# Patient Record
Sex: FEMALE | Race: WHITE | NOT HISPANIC OR LATINO | Employment: PART TIME | URBAN - METROPOLITAN AREA
[De-identification: names, ages, dates, MRNs, and addresses within clinical notes are randomized per-mention and may not be internally consistent; named-entity substitution may affect disease eponyms.]

---

## 2021-05-25 ENCOUNTER — RECORDS - HEALTHEAST (OUTPATIENT)
Dept: ADMINISTRATIVE | Facility: CLINIC | Age: 56
End: 2021-05-25

## 2021-05-26 ENCOUNTER — RECORDS - HEALTHEAST (OUTPATIENT)
Dept: ADMINISTRATIVE | Facility: OTHER | Age: 56
End: 2021-05-26

## 2021-05-28 ENCOUNTER — RECORDS - HEALTHEAST (OUTPATIENT)
Dept: ADMINISTRATIVE | Facility: CLINIC | Age: 56
End: 2021-05-28

## 2021-05-28 ENCOUNTER — RECORDS - HEALTHEAST (OUTPATIENT)
Dept: ADMINISTRATIVE | Facility: OTHER | Age: 56
End: 2021-05-28

## 2021-05-30 ENCOUNTER — AMBULATORY - HEALTHEAST (OUTPATIENT)
Dept: SURGERY | Facility: HOSPITAL | Age: 56
End: 2021-05-30

## 2021-05-30 DIAGNOSIS — Z11.59 ENCOUNTER FOR SCREENING FOR OTHER VIRAL DISEASES: ICD-10-CM

## 2021-06-27 DIAGNOSIS — Z11.59 ENCOUNTER FOR SCREENING FOR OTHER VIRAL DISEASES: ICD-10-CM

## 2021-07-13 ENCOUNTER — RECORDS - HEALTHEAST (OUTPATIENT)
Dept: ADMINISTRATIVE | Facility: CLINIC | Age: 56
End: 2021-07-13

## 2021-07-21 ENCOUNTER — ANCILLARY PROCEDURE (OUTPATIENT)
Dept: ULTRASOUND IMAGING | Facility: HOSPITAL | Age: 56
End: 2021-07-21
Attending: ANESTHESIOLOGY
Payer: COMMERCIAL

## 2021-07-21 ENCOUNTER — ANESTHESIA EVENT (OUTPATIENT)
Dept: SURGERY | Facility: HOSPITAL | Age: 56
End: 2021-07-21
Payer: COMMERCIAL

## 2021-07-21 ENCOUNTER — ANESTHESIA (OUTPATIENT)
Dept: SURGERY | Facility: HOSPITAL | Age: 56
End: 2021-07-21
Payer: COMMERCIAL

## 2021-07-21 ENCOUNTER — RECORDS - HEALTHEAST (OUTPATIENT)
Dept: ADMINISTRATIVE | Facility: CLINIC | Age: 56
End: 2021-07-21

## 2021-07-21 ENCOUNTER — HOSPITAL ENCOUNTER (OUTPATIENT)
Facility: HOSPITAL | Age: 56
Discharge: HOME | End: 2021-07-22
Attending: OBSTETRICS & GYNECOLOGY | Admitting: OBSTETRICS & GYNECOLOGY
Payer: COMMERCIAL

## 2021-07-21 DIAGNOSIS — N81.2 UTEROVAGINAL PROLAPSE, INCOMPLETE: Primary | ICD-10-CM

## 2021-07-21 PROBLEM — Z98.890 POSTOPERATIVE STATE: Status: ACTIVE | Noted: 2021-07-21

## 2021-07-21 LAB
ABO/RH(D): NORMAL
ANTIBODY SCREEN: NEGATIVE
HCG SERPL-ACNC: 7 MLU/ML (ref 0–4)
HCG SERPL-ACNC: 7 MLU/ML (ref 0–4)
SPECIMEN EXPIRATION DATE: NORMAL

## 2021-07-21 PROCEDURE — 250N000009 HC RX 250: Performed by: ANESTHESIOLOGY

## 2021-07-21 PROCEDURE — 88307 TISSUE EXAM BY PATHOLOGIST: CPT | Mod: TC | Performed by: OBSTETRICS & GYNECOLOGY

## 2021-07-21 PROCEDURE — 250N000011 HC RX IP 250 OP 636: Performed by: STUDENT IN AN ORGANIZED HEALTH CARE EDUCATION/TRAINING PROGRAM

## 2021-07-21 PROCEDURE — 250N000011 HC RX IP 250 OP 636: Performed by: OBSTETRICS & GYNECOLOGY

## 2021-07-21 PROCEDURE — 250N000013 HC RX MED GY IP 250 OP 250 PS 637: Performed by: OBSTETRICS & GYNECOLOGY

## 2021-07-21 PROCEDURE — 250N000009 HC RX 250: Performed by: STUDENT IN AN ORGANIZED HEALTH CARE EDUCATION/TRAINING PROGRAM

## 2021-07-21 PROCEDURE — 258N000003 HC RX IP 258 OP 636: Performed by: ANESTHESIOLOGY

## 2021-07-21 PROCEDURE — 999N000141 HC STATISTIC PRE-PROCEDURE NURSING ASSESSMENT: Performed by: OBSTETRICS & GYNECOLOGY

## 2021-07-21 PROCEDURE — 96372 THER/PROPH/DIAG INJ SC/IM: CPT | Performed by: OBSTETRICS & GYNECOLOGY

## 2021-07-21 PROCEDURE — 36415 COLL VENOUS BLD VENIPUNCTURE: CPT | Performed by: OBSTETRICS & GYNECOLOGY

## 2021-07-21 PROCEDURE — 360N000080 HC SURGERY LEVEL 7, PER MIN: Performed by: OBSTETRICS & GYNECOLOGY

## 2021-07-21 PROCEDURE — 250N000009 HC RX 250: Performed by: OBSTETRICS & GYNECOLOGY

## 2021-07-21 PROCEDURE — 272N000001 HC OR GENERAL SUPPLY STERILE: Performed by: OBSTETRICS & GYNECOLOGY

## 2021-07-21 PROCEDURE — 250N000011 HC RX IP 250 OP 636: Performed by: ANESTHESIOLOGY

## 2021-07-21 PROCEDURE — 250N000011 HC RX IP 250 OP 636: Performed by: NURSE ANESTHETIST, CERTIFIED REGISTERED

## 2021-07-21 PROCEDURE — 370N000017 HC ANESTHESIA TECHNICAL FEE, PER MIN: Performed by: OBSTETRICS & GYNECOLOGY

## 2021-07-21 PROCEDURE — 86900 BLOOD TYPING SEROLOGIC ABO: CPT | Performed by: OBSTETRICS & GYNECOLOGY

## 2021-07-21 PROCEDURE — 258N000003 HC RX IP 258 OP 636: Performed by: OBSTETRICS & GYNECOLOGY

## 2021-07-21 PROCEDURE — 84702 CHORIONIC GONADOTROPIN TEST: CPT | Performed by: OBSTETRICS & GYNECOLOGY

## 2021-07-21 PROCEDURE — C1771 REP DEV, URINARY, W/SLING: HCPCS | Performed by: OBSTETRICS & GYNECOLOGY

## 2021-07-21 PROCEDURE — 710N000009 HC RECOVERY PHASE 1, LEVEL 1, PER MIN: Performed by: OBSTETRICS & GYNECOLOGY

## 2021-07-21 DEVICE — MESH SLING SINGLE INCISION ALTIS 519650: Type: IMPLANTABLE DEVICE | Site: PELVIS | Status: FUNCTIONAL

## 2021-07-21 RX ORDER — LIDOCAINE HYDROCHLORIDE AND EPINEPHRINE 10; 10 MG/ML; UG/ML
INJECTION, SOLUTION INFILTRATION; PERINEURAL PRN
Status: DISCONTINUED | OUTPATIENT
Start: 2021-07-21 | End: 2021-07-21 | Stop reason: HOSPADM

## 2021-07-21 RX ORDER — LORATADINE 10 MG/1
10 TABLET ORAL DAILY
COMMUNITY

## 2021-07-21 RX ORDER — CEFAZOLIN SODIUM 2 G/100ML
2 INJECTION, SOLUTION INTRAVENOUS SEE ADMIN INSTRUCTIONS
Status: DISCONTINUED | OUTPATIENT
Start: 2021-07-21 | End: 2021-07-21 | Stop reason: HOSPADM

## 2021-07-21 RX ORDER — MAGNESIUM HYDROXIDE 1200 MG/15ML
LIQUID ORAL PRN
Status: DISCONTINUED | OUTPATIENT
Start: 2021-07-21 | End: 2021-07-21 | Stop reason: HOSPADM

## 2021-07-21 RX ORDER — HYDROMORPHONE HCL IN WATER/PF 6 MG/30 ML
0.2 PATIENT CONTROLLED ANALGESIA SYRINGE INTRAVENOUS EVERY 5 MIN PRN
Status: DISCONTINUED | OUTPATIENT
Start: 2021-07-21 | End: 2021-07-21 | Stop reason: HOSPADM

## 2021-07-21 RX ORDER — ONDANSETRON 4 MG/1
4 TABLET, ORALLY DISINTEGRATING ORAL EVERY 6 HOURS PRN
Status: DISCONTINUED | OUTPATIENT
Start: 2021-07-21 | End: 2021-07-22 | Stop reason: HOSPADM

## 2021-07-21 RX ORDER — MAGNESIUM SULFATE 4 G/50ML
4 INJECTION INTRAVENOUS ONCE
Status: COMPLETED | OUTPATIENT
Start: 2021-07-21 | End: 2021-07-22

## 2021-07-21 RX ORDER — SODIUM CHLORIDE, SODIUM LACTATE, POTASSIUM CHLORIDE, CALCIUM CHLORIDE 600; 310; 30; 20 MG/100ML; MG/100ML; MG/100ML; MG/100ML
INJECTION, SOLUTION INTRAVENOUS CONTINUOUS
Status: DISCONTINUED | OUTPATIENT
Start: 2021-07-21 | End: 2021-07-22 | Stop reason: HOSPADM

## 2021-07-21 RX ORDER — PROPOFOL 10 MG/ML
INJECTION, EMULSION INTRAVENOUS CONTINUOUS PRN
Status: DISCONTINUED | OUTPATIENT
Start: 2021-07-21 | End: 2021-07-21

## 2021-07-21 RX ORDER — FENTANYL CITRATE 50 UG/ML
50 INJECTION, SOLUTION INTRAMUSCULAR; INTRAVENOUS EVERY 5 MIN PRN
Status: DISCONTINUED | OUTPATIENT
Start: 2021-07-21 | End: 2021-07-21 | Stop reason: HOSPADM

## 2021-07-21 RX ORDER — OXYCODONE HYDROCHLORIDE 5 MG/1
10 TABLET ORAL EVERY 4 HOURS PRN
Status: DISCONTINUED | OUTPATIENT
Start: 2021-07-21 | End: 2021-07-22 | Stop reason: HOSPADM

## 2021-07-21 RX ORDER — ACETAMINOPHEN 325 MG/1
975 TABLET ORAL EVERY 6 HOURS
Status: DISCONTINUED | OUTPATIENT
Start: 2021-07-22 | End: 2021-07-22 | Stop reason: HOSPADM

## 2021-07-21 RX ORDER — ONDANSETRON 4 MG/1
4 TABLET, ORALLY DISINTEGRATING ORAL EVERY 30 MIN PRN
Status: DISCONTINUED | OUTPATIENT
Start: 2021-07-21 | End: 2021-07-21 | Stop reason: HOSPADM

## 2021-07-21 RX ORDER — CHLORAL HYDRATE 500 MG
1 CAPSULE ORAL DAILY
COMMUNITY

## 2021-07-21 RX ORDER — LACTOBACILLUS RHAMNOSUS GG 10B CELL
1 CAPSULE ORAL DAILY
COMMUNITY

## 2021-07-21 RX ORDER — FENTANYL CITRATE 50 UG/ML
INJECTION, SOLUTION INTRAMUSCULAR; INTRAVENOUS PRN
Status: DISCONTINUED | OUTPATIENT
Start: 2021-07-21 | End: 2021-07-21

## 2021-07-21 RX ORDER — KETOROLAC TROMETHAMINE 30 MG/ML
INJECTION, SOLUTION INTRAMUSCULAR; INTRAVENOUS PRN
Status: DISCONTINUED | OUTPATIENT
Start: 2021-07-21 | End: 2021-07-21

## 2021-07-21 RX ORDER — KETOROLAC TROMETHAMINE 15 MG/ML
15 INJECTION, SOLUTION INTRAMUSCULAR; INTRAVENOUS EVERY 6 HOURS
Status: DISCONTINUED | OUTPATIENT
Start: 2021-07-22 | End: 2021-07-22 | Stop reason: HOSPADM

## 2021-07-21 RX ORDER — DEXAMETHASONE SODIUM PHOSPHATE 10 MG/ML
INJECTION, SOLUTION INTRAMUSCULAR; INTRAVENOUS PRN
Status: DISCONTINUED | OUTPATIENT
Start: 2021-07-21 | End: 2021-07-21

## 2021-07-21 RX ORDER — PROPOFOL 10 MG/ML
INJECTION, EMULSION INTRAVENOUS PRN
Status: DISCONTINUED | OUTPATIENT
Start: 2021-07-21 | End: 2021-07-21

## 2021-07-21 RX ORDER — HYDROMORPHONE HCL IN WATER/PF 6 MG/30 ML
0.4 PATIENT CONTROLLED ANALGESIA SYRINGE INTRAVENOUS
Status: DISCONTINUED | OUTPATIENT
Start: 2021-07-21 | End: 2021-07-22 | Stop reason: HOSPADM

## 2021-07-21 RX ORDER — SODIUM CHLORIDE, SODIUM LACTATE, POTASSIUM CHLORIDE, CALCIUM CHLORIDE 600; 310; 30; 20 MG/100ML; MG/100ML; MG/100ML; MG/100ML
INJECTION, SOLUTION INTRAVENOUS CONTINUOUS
Status: DISCONTINUED | OUTPATIENT
Start: 2021-07-21 | End: 2021-07-21 | Stop reason: HOSPADM

## 2021-07-21 RX ORDER — HYDROMORPHONE HCL IN WATER/PF 6 MG/30 ML
0.2 PATIENT CONTROLLED ANALGESIA SYRINGE INTRAVENOUS
Status: DISCONTINUED | OUTPATIENT
Start: 2021-07-21 | End: 2021-07-22 | Stop reason: HOSPADM

## 2021-07-21 RX ORDER — DEXMEDETOMIDINE HYDROCHLORIDE 4 UG/ML
.2-1 INJECTION, SOLUTION INTRAVENOUS CONTINUOUS
Status: DISCONTINUED | OUTPATIENT
Start: 2021-07-21 | End: 2021-07-21 | Stop reason: HOSPADM

## 2021-07-21 RX ORDER — KETAMINE HYDROCHLORIDE 50 MG/ML
INJECTION, SOLUTION INTRAMUSCULAR; INTRAVENOUS PRN
Status: DISCONTINUED | OUTPATIENT
Start: 2021-07-21 | End: 2021-07-21

## 2021-07-21 RX ORDER — HALOPERIDOL 5 MG/ML
1 INJECTION INTRAMUSCULAR
Status: DISCONTINUED | OUTPATIENT
Start: 2021-07-21 | End: 2021-07-21 | Stop reason: HOSPADM

## 2021-07-21 RX ORDER — NALOXONE HYDROCHLORIDE 0.4 MG/ML
0.4 INJECTION, SOLUTION INTRAMUSCULAR; INTRAVENOUS; SUBCUTANEOUS
Status: DISCONTINUED | OUTPATIENT
Start: 2021-07-21 | End: 2021-07-22 | Stop reason: HOSPADM

## 2021-07-21 RX ORDER — ONDANSETRON 2 MG/ML
4 INJECTION INTRAMUSCULAR; INTRAVENOUS EVERY 30 MIN PRN
Status: DISCONTINUED | OUTPATIENT
Start: 2021-07-21 | End: 2021-07-21 | Stop reason: HOSPADM

## 2021-07-21 RX ORDER — ACETAMINOPHEN 325 MG/1
650 TABLET ORAL EVERY 6 HOURS
Status: DISCONTINUED | OUTPATIENT
Start: 2021-07-25 | End: 2021-07-22 | Stop reason: HOSPADM

## 2021-07-21 RX ORDER — HEPARIN SODIUM 5000 [USP'U]/.5ML
5000 INJECTION, SOLUTION INTRAVENOUS; SUBCUTANEOUS ONCE
Status: COMPLETED | OUTPATIENT
Start: 2021-07-21 | End: 2021-07-21

## 2021-07-21 RX ORDER — LIDOCAINE 40 MG/G
CREAM TOPICAL
Status: DISCONTINUED | OUTPATIENT
Start: 2021-07-21 | End: 2021-07-21

## 2021-07-21 RX ORDER — ONDANSETRON 2 MG/ML
INJECTION INTRAMUSCULAR; INTRAVENOUS PRN
Status: DISCONTINUED | OUTPATIENT
Start: 2021-07-21 | End: 2021-07-21

## 2021-07-21 RX ORDER — HEPARIN SODIUM 5000 [USP'U]/.5ML
5000 INJECTION, SOLUTION INTRAVENOUS; SUBCUTANEOUS EVERY 8 HOURS
Status: DISCONTINUED | OUTPATIENT
Start: 2021-07-22 | End: 2021-07-22 | Stop reason: HOSPADM

## 2021-07-21 RX ORDER — OXYCODONE HYDROCHLORIDE 5 MG/1
5 TABLET ORAL EVERY 4 HOURS PRN
Status: DISCONTINUED | OUTPATIENT
Start: 2021-07-21 | End: 2021-07-22 | Stop reason: HOSPADM

## 2021-07-21 RX ORDER — DOCUSATE SODIUM 100 MG/1
100 CAPSULE, LIQUID FILLED ORAL 2 TIMES DAILY PRN
Status: DISCONTINUED | OUTPATIENT
Start: 2021-07-21 | End: 2021-07-22 | Stop reason: HOSPADM

## 2021-07-21 RX ORDER — PROCHLORPERAZINE MALEATE 10 MG
10 TABLET ORAL EVERY 6 HOURS PRN
Status: DISCONTINUED | OUTPATIENT
Start: 2021-07-21 | End: 2021-07-22 | Stop reason: HOSPADM

## 2021-07-21 RX ORDER — BUPIVACAINE HYDROCHLORIDE 2.5 MG/ML
INJECTION, SOLUTION INFILTRATION; PERINEURAL PRN
Status: DISCONTINUED | OUTPATIENT
Start: 2021-07-21 | End: 2021-07-21 | Stop reason: HOSPADM

## 2021-07-21 RX ORDER — NALOXONE HYDROCHLORIDE 0.4 MG/ML
0.2 INJECTION, SOLUTION INTRAMUSCULAR; INTRAVENOUS; SUBCUTANEOUS
Status: DISCONTINUED | OUTPATIENT
Start: 2021-07-21 | End: 2021-07-22 | Stop reason: HOSPADM

## 2021-07-21 RX ORDER — ACETAMINOPHEN 325 MG/1
975 TABLET ORAL ONCE
Status: COMPLETED | OUTPATIENT
Start: 2021-07-21 | End: 2021-07-21

## 2021-07-21 RX ORDER — LIDOCAINE 40 MG/G
CREAM TOPICAL
Status: DISCONTINUED | OUTPATIENT
Start: 2021-07-21 | End: 2021-07-22 | Stop reason: HOSPADM

## 2021-07-21 RX ORDER — CEFAZOLIN SODIUM 2 G/100ML
2 INJECTION, SOLUTION INTRAVENOUS
Status: COMPLETED | OUTPATIENT
Start: 2021-07-21 | End: 2021-07-21

## 2021-07-21 RX ORDER — LIDOCAINE HYDROCHLORIDE 20 MG/ML
INJECTION, SOLUTION INFILTRATION; PERINEURAL PRN
Status: DISCONTINUED | OUTPATIENT
Start: 2021-07-21 | End: 2021-07-21

## 2021-07-21 RX ORDER — IBUPROFEN 800 MG/1
800 TABLET, FILM COATED ORAL EVERY 6 HOURS
Status: DISCONTINUED | OUTPATIENT
Start: 2021-07-22 | End: 2021-07-22 | Stop reason: HOSPADM

## 2021-07-21 RX ORDER — ONDANSETRON 2 MG/ML
4 INJECTION INTRAMUSCULAR; INTRAVENOUS EVERY 6 HOURS PRN
Status: DISCONTINUED | OUTPATIENT
Start: 2021-07-21 | End: 2021-07-22 | Stop reason: HOSPADM

## 2021-07-21 RX ADMIN — FENTANYL CITRATE 100 MCG: 50 INJECTION, SOLUTION INTRAMUSCULAR; INTRAVENOUS at 14:13

## 2021-07-21 RX ADMIN — PROPOFOL 200 MCG/KG/MIN: 10 INJECTION, EMULSION INTRAVENOUS at 14:13

## 2021-07-21 RX ADMIN — SODIUM CHLORIDE, POTASSIUM CHLORIDE, SODIUM LACTATE AND CALCIUM CHLORIDE: 600; 310; 30; 20 INJECTION, SOLUTION INTRAVENOUS at 18:39

## 2021-07-21 RX ADMIN — ROCURONIUM BROMIDE 20 MG: 10 INJECTION, SOLUTION INTRAVENOUS at 17:26

## 2021-07-21 RX ADMIN — SODIUM CHLORIDE, POTASSIUM CHLORIDE, SODIUM LACTATE AND CALCIUM CHLORIDE: 600; 310; 30; 20 INJECTION, SOLUTION INTRAVENOUS at 19:30

## 2021-07-21 RX ADMIN — ACETAMINOPHEN 975 MG: 325 TABLET ORAL at 10:57

## 2021-07-21 RX ADMIN — ONDANSETRON 4 MG: 2 INJECTION INTRAMUSCULAR; INTRAVENOUS at 14:38

## 2021-07-21 RX ADMIN — ROCURONIUM BROMIDE 50 MG: 10 INJECTION, SOLUTION INTRAVENOUS at 15:02

## 2021-07-21 RX ADMIN — HYDROMORPHONE HYDROCHLORIDE 0.5 MG: 1 INJECTION, SOLUTION INTRAMUSCULAR; INTRAVENOUS; SUBCUTANEOUS at 15:10

## 2021-07-21 RX ADMIN — MIDAZOLAM HYDROCHLORIDE 2 MG: 1 INJECTION, SOLUTION INTRAMUSCULAR; INTRAVENOUS at 13:59

## 2021-07-21 RX ADMIN — FENTANYL CITRATE 50 MCG: 50 INJECTION, SOLUTION INTRAMUSCULAR; INTRAVENOUS at 19:51

## 2021-07-21 RX ADMIN — ROCURONIUM BROMIDE 40 MG: 10 INJECTION, SOLUTION INTRAVENOUS at 14:13

## 2021-07-21 RX ADMIN — HYDROMORPHONE HYDROCHLORIDE 0.4 MG: 0.2 INJECTION, SOLUTION INTRAMUSCULAR; INTRAVENOUS; SUBCUTANEOUS at 22:00

## 2021-07-21 RX ADMIN — DEXAMETHASONE SODIUM PHOSPHATE 10 MG: 10 INJECTION, SOLUTION INTRAMUSCULAR; INTRAVENOUS at 14:13

## 2021-07-21 RX ADMIN — ROCURONIUM BROMIDE 10 MG: 10 INJECTION, SOLUTION INTRAVENOUS at 14:34

## 2021-07-21 RX ADMIN — LIDOCAINE HYDROCHLORIDE 60 MG: 20 INJECTION, SOLUTION INFILTRATION; PERINEURAL at 14:13

## 2021-07-21 RX ADMIN — SODIUM CHLORIDE, POTASSIUM CHLORIDE, SODIUM LACTATE AND CALCIUM CHLORIDE: 600; 310; 30; 20 INJECTION, SOLUTION INTRAVENOUS at 10:43

## 2021-07-21 RX ADMIN — HEPARIN SODIUM 5000 UNITS: 10000 INJECTION, SOLUTION INTRAVENOUS; SUBCUTANEOUS at 11:49

## 2021-07-21 RX ADMIN — ROCURONIUM BROMIDE 30 MG: 10 INJECTION, SOLUTION INTRAVENOUS at 16:22

## 2021-07-21 RX ADMIN — PROPOFOL 100 MG: 10 INJECTION, EMULSION INTRAVENOUS at 14:13

## 2021-07-21 RX ADMIN — MAGNESIUM SULFATE HEPTAHYDRATE 4 G: 80 INJECTION, SOLUTION INTRAVENOUS at 11:06

## 2021-07-21 RX ADMIN — CEFAZOLIN SODIUM 2 G: 2 INJECTION, SOLUTION INTRAVENOUS at 14:22

## 2021-07-21 RX ADMIN — KETAMINE HYDROCHLORIDE 25 MG: 50 INJECTION, SOLUTION INTRAMUSCULAR; INTRAVENOUS at 14:13

## 2021-07-21 RX ADMIN — CEFAZOLIN SODIUM 2 G: 2 INJECTION, SOLUTION INTRAVENOUS at 18:22

## 2021-07-21 RX ADMIN — DEXMEDETOMIDINE HYDROCHLORIDE 0.4 MCG/KG/HR: 4 INJECTION, SOLUTION INTRAVENOUS at 14:14

## 2021-07-21 RX ADMIN — KETOROLAC TROMETHAMINE 15 MG: 30 INJECTION, SOLUTION INTRAMUSCULAR; INTRAVENOUS at 18:25

## 2021-07-21 ASSESSMENT — MIFFLIN-ST. JEOR
SCORE: 1194.26
SCORE: 1238.19
SCORE: 1194.51

## 2021-07-21 ASSESSMENT — ACTIVITIES OF DAILY LIVING (ADL)
CONCENTRATING,_REMEMBERING_OR_MAKING_DECISIONS_DIFFICULTY: NO
FALL_HISTORY_WITHIN_LAST_SIX_MONTHS: NO
HEARING_DIFFICULTY_OR_DEAF: NO
WEAR_GLASSES_OR_BLIND: YES
DIFFICULTY_EATING/SWALLOWING: NO
DIFFICULTY_COMMUNICATING: NO

## 2021-07-21 NOTE — PROGRESS NOTES
Quantitative hCG was 7, repeat was also 7.  Patient is in menopause for more than 5 years,  had a vasectomy.  Reviewed unlikely that she is pregnant though always a small risk.  She understands and agrees to proceed with hysterectomy as scheduled

## 2021-07-21 NOTE — ANESTHESIA CARE TRANSFER NOTE
Patient: Taina Castro    Procedure(s):  ROBOTIC ASSISTED LAPAROSCOPIC SUPRACERVICAL HYSTERECTOMY, RIGHT SALPINGECTOMY, BILATERAL OOPHORECTOMY, EXSTENSIVE LYSIS OF ADHESIONS, UTEROSACRAL LIGAMENT SUSPENSION DA LUIS ANGEL XI  MIDURETHRAL SLING, CYSTOSCOPY, ANTERIOR COLPORRHAPHY,    Diagnosis: Uterovaginal prolapse, complete [N81.3]  Urinary, incontinence, stress female [N39.3]  Cystocele with rectocele [N81.10, N81.6]  Diagnosis Additional Information: No value filed.    Anesthesia Type:   General     Note:    Oropharynx: oropharynx clear of all foreign objects and spontaneously breathing  Level of Consciousness: drowsy  Oxygen Supplementation: face mask  Level of Supplemental Oxygen (L/min / FiO2): 6  Independent Airway: airway patency satisfactory and stable  Dentition: dentition unchanged  Vital Signs Stable: post-procedure vital signs reviewed and stable  Report to RN Given: handoff report given  Patient transferred to: PACU    Handoff Report: Identifed the Patient, Identified the Reponsible Provider, Reviewed the pertinent medical history, Discussed the surgical course, Reviewed Intra-OP anesthesia mangement and issues during anesthesia, Set expectations for post-procedure period and Allowed opportunity for questions and acknowledgement of understanding      Vitals:  Vitals Value Taken Time   /66    Temp 98.0    Pulse 56 07/21/21 1849   Resp 19 07/21/21 1849   SpO2 100 % 07/21/21 1849   Vitals shown include unvalidated device data.    Electronically Signed By: DEVON Bautista CRNA  July 21, 2021  6:50 PM

## 2021-07-21 NOTE — ANESTHESIA PROCEDURE NOTES
Airway       Patient location during procedure: OR       Procedure Start/Stop Times: 7/21/2021 2:17 PM  Staff -        Anesthesiologist:  Grisel Pino MD       CRNA: Farrah Salazar APRN CRNA       Other Anesthesia Staff: Abby Dhillon       Performed By: SRNA  Consent for Airway        Urgency: elective  Indications and Patient Condition       Indications for airway management: anahi-procedural       Induction type:intravenous       Mask difficulty assessment: 1 - vent by mask    Final Airway Details       Final airway type: endotracheal airway       Successful airway: ETT - single  Endotracheal Airway Details        ETT size (mm): 7.0       Cuffed: yes       Cuff volume (mL): 8       Successful intubation technique: direct laryngoscopy       DL Blade Type: Cofefy 2       Grade View of Cords: 1       Adjucts: stylet and tooth guard       Position: Left       Measured from: gums/teeth       Secured at (cm): 21       Bite block used: None    Post intubation assessment        Placement verified by: capnometry, equal breath sounds and chest rise        Number of attempts at approach: 1       Number of other approaches attempted: 0       Secured with: commercial tube henderson and silk tape       Ease of procedure: easy       Dentition: Intact and Unchanged

## 2021-07-21 NOTE — OP NOTE
Operative Note    Name:  Taina Castro  Location: [facility]  Procedure Date:  07/21/21  PCP:  Nichole Huynh    Procedure  Procedure(s):  ROBOTIC ASSISTED LAPAROSCOPIC SUPRACERVICAL HYSTERECTOMY, RIGHT SALPINGECTOMY, BILATERAL OOPHORECTOMY, EXSTENSIVE LYSIS OF ADHESIONS, UTEROSACRAL LIGAMENT SUSPENSION DA LUIS ANGEL XI  MIDURETHRAL SLING, CYSTOSCOPY, ANTERIOR COLPORRHAPHY,        Pre-Procedure Diagnosis:  Uterovaginal prolapse  Cystocele  Stress incontinence  Family history of ovarian cancer    Post-Procedure Diagnosis:    Same  Abdominal adhesions    Surgeon(s):  Anitra Stephens MD    Anesthesia Type: General    Findings:  Exam under anesthesia confirmed stage II uterovaginal prolapse and cystocele  On laparoscopy after entry no injury from entry.  There were omental adhesions to the anterior abdominal wall that had to be taken down before accessory trochars could be placed.  Absent left fallopian tube, normal bilateral ovaries and fallopian tubes.  Normal cervix and uterus  Dense adhesion from the small intestines to the right ureter which was reimplanted directly over the sacral promontory with no clear clear planes or visualization of the sacral promontory identified.  On cystoscopy after hysterectomy and placement of uterosacral ligament suspension sutures being tied down the bladder was normal without injury from the procedure and there is brisk bilaterally efflux of urine from the bilateral ureters which are in the normal anatomic position in the trigone.  No trabeculations, mass, diverticula or any other abnormality  After anterior colporrhaphy and placement of the mid urethral sling repeat cystoscopy confirmed normal bladder no foreign body, no complications from the procedure, brisk bilateral efflux of urine was noted from the bilateral ureters which are in the normal anatomic position  After all repairs completed the vagina is well supported with no residual  prolapse      Complications:    Change from planned procedure due to anatomic complications    Specimens:    ID Type Source Tests Collected by Time Destination   1 : UTERUS, BILATERAL OVARIES, RIGHT FALLOPIAN TUBE Tissue Uterus, Right Fallopian Tube and Ovary SURGICAL PATHOLOGY EXAM Anitra Stephens MD 7/21/2021  5:34 PM           Lines, Drains, Airways:   Kang catheter    Implants:  Implant Name Type Inv. Item Serial No.  Lot No. LRB No. Used Action   MESH SLING SINGLE INCISION ALTIS [id number] - [id number] Mesh MESH SLING SINGLE INCISION ALTIS [id number]  COLOPLAST [id number] N/A 1 Implanted       Estimated Blood Loss: 50mL    Indications:  Taina is a 55-year-old female with symptomatic uterovaginal prolapse and stress incontinence.  She also has a family history of ovarian cancer.  After discussion of risks, benefits, alternatives the decision was made to proceed with surgical management.  We had planned robotic sacrocolpopexy, supracervical hysterectomy with bilateral salpingo-oophorectomy and mid urethral sling using a single incision sling given her history of a prior ureteral reimplant which may have been implanted in the dome of the bladder making a retropubic sling relatively contraindicated.    Operative Report:    The patient was seen in preop prior to the procedure where again we reviewed risks, benefits and alternatives and consent for the procedure was signed. She was then taken to the operating room and placed on the OR table. She was placed under general anesthesia without complication. She was then placed in dorsal lithotomy position in yellow fin stirrups with care to avoid tension on the joints or any hyperflexion. Exam under anesthesia was then performed and she was prepped and draped in the usual sterile fashion.     Given her history of a prior ureteral reimplant we have made the decision to start the procedure with a cystoscopy to identify where the ureteral orifice was located.   Notably this was located in the usual position on the trigone and there was brisk bile reflux of urine noted with an otherwise normal bladder. A Kang catheter was placed.  The medium V care was placed in the standard fashion with uterus sounding to 8 cm, the cervix was visibly normal.    We then proceeded to the laparoscopic portion of the procedure.  We attempted to obtain insufflation with a varies through a an 8 mm incision approximately 2 cm superior to the umbilicus in the midline.  After injecting with 0.5% Marcaine.  With initial insertion of the Veress there were 2 clicks and there was a positive saline drop test indicating intraperitoneal axis but opening pressure was 9, this was repeated a second time again with the same findings with a decided to proceed with direct visualization using an Optiview robotic trocar.  This was inserted without complication with care to ensure that we were entering all the layers drain entry without complication.  Once we were confirmed intra-abdominal access the abdomen was insufflated to 15 mmHg.  Inspection of the abdomen revealed no injury from entry however there were some dense and filmy omental adhesions to the anterior abdominal wall extending from the pelvis to just below our laparoscopic trocar site.  The right side was unable to be clearly visualized due to the dense adhesions but we were able to place under direct visualization 2 additional 8 mm robotic trochars approximately 8 cm apart in line with the supraumbilical trocar on the left side after placing the patient in Trendelenburg.  This was done without complication.  Using monopolar scissors with cautery as needed we were able to take down the adhesions until the right abdominal wall was visible.  Approximately 40 minutes was spent safely taking down these adhesions.  We then turned attention to placement of the 2 additional trocars an 8 mm robotic trocar was placed approximately 10 cm to the right of midline  from the supraumbilical trocar with an assistant 10 mm trocar was placed between the supraumbilical and the right most robotic trocar site.     We then started the procedure with right salpingo-oophorectomy the right ureter was identified and was sufficiently below the infundibulopelvic ligament then the endopelvic ligament was electrodesiccated and incised we carried the incision down to the uterus and the round ligament the round ligament was electrodesiccated incised and opened and continued dissection through down the broad ligament to the anterior peritoneal reflection the bladder flap was developed on the right side the uterine arteries were identified attention was then again turned towards the ureter to ensure it was sufficiently out of the way for the uterine arteries the uterine arteries were divided approximately at the level of the uterine cervical junction.  We then turned attention towards the left side the left ovary was intimately involved with the left pelvic sidewall the left fallopian tube was previously removed.  The left ureter was not easily identified at this point we decided to start the procedure with a hysterectomy and to come back to the ovary the left utero-ovarian ligament was electrodesiccated open and incised the left round ligament was electrodesiccated open and incised we carried the incision down the broad ligament to the bladder flap which was opened on the left side the bladder was dissected down the anterior vaginal wall for approximately 3 cm of length.  The left uterine artery was identified electrodesiccated and incised.  At this point we turned attention back to the left ovary the left ovary was grasped the infundibulopelvic ligament was identified there were bowel adhesions to the ovary which were filmy and able to be taken down this allowed us to use identify the left ureter.  Once the left ureter was identified we could clearly see its delineation from the  infundibulopelvic ligament the peritoneum lateral to the ovary was elevated and incised to verbal mobilize the ovary from the pelvic sidewall to isolate the infundibulopelvic ligament.  Once the IP ligament was skeletonized a window was created just underneath the ligament isolating it at the level of the ovary.  This ligament was then electrodesiccated and incised hemostasis was noted.  With the ovary on tension we continued the dissection just underneath the ovary removing ovary from the pelvic sidewall this was done without complication and again care was taken during this entire process to ensure that the ureter was well below the area of dissection..  We then amputated the uterus from the cervix.  All specimens were placed in the paracolic gutter.    Notably the patient had a large stool burden we were able to mobilize the rectum and the small intestines out of the pelvis however there was a dense adhesion between her prior right ureteral reimplant as well as attachment to the small bowel directly over what would be the presumed area of the sacral promontory.  The ureter was notably approximately half a centimeter from this area of dense adhesion directly over the sacral promontory.  We started to open this adhesion but no planes were identified and given her close proximity of the ureter as well as the dense adhesion to the bowel pulling the ureter on tension the decision was made to change surgical plan and not proceed with sacrocolpopexy as the sacral promontory could not be safely identified.  We did switch to the 30 degree robotic camera at this point to see if this would aid in assisted visualization we were able to see what appeared to be the sacral promontory however this area was not firm and given the dense adhesion in close proximity to the ureter with her prior to reimplant we made the decision to proceed instead with laparoscopic uterosacral ligament suspension. Four sutures of 0 Prolene were  placed in the uterosacral ligaments after identifying the ureter and confirming it was lateral to the ligament.  We placed the sutures at approximately the level of the ischial spine and the uterosacral ligament with 2 sutures on the right and 2 sutures on the left.  The more inferior suture was attached to the anterior and posterior vagina and cervix at the right most lateral position.  The second right more superior uterosacral suture was attached to the posterior vagina cervix and anterior vagina just lateral to the lateralmost suture just to the right of midline.  The same was repeated on the left side in a similar fashion.  The sutures were then tied down bringing the cervix up deep into the pelvis.  This was all done without complication.    All specimens were grasped in place and a 10 mm Endo Catch bag which was brought out through the robotic trocar.  We then closed the 10 mm system port using the Serge-Brad device under direct visualization with an interrupted suture of 0 Vicryl.  At this point cystoscopy was performed confirming no injury from the hysterectomy or uterosacral suspension and brisk bilateral reflux of urine was noted from the bilateral ureters.  The Kang was replaced and attention was turned back to the abdomen where the specimen bag was brought out after extending the supraumbilical incision to approximately 2 cm in size.  The uterus bilateral ovaries were brought out in entirety.  The fascia was grasped with Theodore clamps with care to ensure avoiding any underlying structures.  The fascia was then closed with a running suture of 0 Vicryl.  All laparoscopic sites were then closed with a running suture of 4-0 Monocryl and sealed with Dermabond.    We then turned attention to the vaginal portion of the procedure.  We started the procedure with placement of a mid urethral sling.  A 1.5 cm vertical midline incision was made after identification of the mid urethra using the Kang catheter  intention.  Using the Metzenbaum scissors periurethral tunnels were made to the ipsilateral pubic rami.  A finger was placed in each tunnel to ensure sufficient space for the sling.  The Altis was then opened and using the helical trochars through the previously dissected periurethral tunnel the static anchor was placed in the most superior medial portion of the obturator membrane on the right side.  The dynamic anchor was placed using the helical trocar through the periurethral tunnel into the most superior and medial portion of the left obturator membrane.  At this point cystoscopy was performed confirming no injury from the procedure and a normal urethra.  The sling was then tensioned so that it was flush with the urethra but not over tension.  The tensioning suture was then trimmed and the incision was irrigated and closed with a running suture of 2-0 Vicryl.      We then turned attention towards the anterior repair.  The anterior vagina was grasped with Allis clamps along the midline and injected with 1% lidocaine with epinephrine.  A horizontal incision was made approximately 2 cm from the cervix.  We then carried the incision up by undermining the epithelium off of the fibromuscular connective tissue using Metzenbaum scissors and incising in the midline.  The fibromuscular connective tissue was dissected off of the underlying epithelium bilaterally.  Using 2-0 PDS the fibromuscular connective tissue was plicated in the midline effectively reducing the cystocele using approximately 5 interrupted stitches.  The vaginal epithelium was minimally trimmed the incision was irrigated hemostasis was noted.  The vaginal epithelium was then closed with a running suture of 2-0 Vicryl.  The vagina was well supported repeat cystoscopy after anterior repair confirmed normal bladder, normal urethra, brisk bilateral efflux of urine, no foreign body or any other complication from the procedure.  The Kang catheter was  replaced.  The patient tolerated the procedure well without complication.  I was present and scrubbed with the procedure in entirety.  Sponge, needle, instrument counts were correct at the end of the procedure        Anitra Stephens

## 2021-07-21 NOTE — ANESTHESIA PREPROCEDURE EVALUATION
Anesthesia Pre-Procedure Evaluation    Patient: Taina Castro   MRN: [id number] : 1965        Preoperative Diagnosis: Uterovaginal prolapse, complete [N81.3]  Urinary, incontinence, stress female [N39.3]  Cystocele with rectocele [N81.10, N81.6]   Procedure : Procedure(s):  ROBOTIC ASSISTED LAPAROSCOPIC SUPRACERVICAL HYSTERECTOMY, BILATERAL SALPINGO-OOPHORECTOMY, SACROCOLPOPEXYDA LUIS ANGEL XI  MIDURETHRAL SLING, CYSTOSCOPY, POSSIBLE ANTERIOR COLPORRHAPHY,  POSSIBLE POSTERIOR COLPORRHAPHY     Past Medical History:   Diagnosis Date     Motion sickness       History reviewed. No pertinent surgical history.   No Known Allergies   Social History     Tobacco Use     Smoking status: Never Smoker     Smokeless tobacco: Never Used   Substance Use Topics     Alcohol use: Yes     Comment: occass      Wt Readings from Last 1 Encounters:   21 57.4 kg (126 lb 9.6 oz)        Anesthesia Evaluation   Pt has not had prior anesthetic         ROS/MED HX  ENT/Pulmonary:  - neg pulmonary ROS     Neurologic: Comment: Motion sickness      Cardiovascular:       METS/Exercise Tolerance: >4 METS    Hematologic:  - neg hematologic  ROS     Musculoskeletal:  - neg musculoskeletal ROS     GI/Hepatic:  - neg GI/hepatic ROS     Renal/Genitourinary: Comment: Uterovaginal prolapse      Endo:  - neg endo ROS     Psychiatric/Substance Use:  - neg psychiatric ROS     Infectious Disease:  - neg infectious disease ROS     Malignancy:       Other:            Physical Exam    Airway        Mallampati: I   TM distance: > 3 FB   Neck ROM: full   Mouth opening: > 3 cm    Respiratory Devices and Support         Dental  no notable dental history         Cardiovascular   cardiovascular exam normal          Pulmonary   pulmonary exam normal                OUTSIDE LABS:  CBC: No results found for: WBC, HGB, HCT, PLT  BMP: No results found for: NA, POTASSIUM, CHLORIDE, CO2, BUN, CR, GLC  COAGS: No results found for: PTT, INR, FIBR  POC: No results  found for: BGM, HCG, HCGS  HEPATIC: No results found for: ALBUMIN, PROTTOTAL, ALT, AST, GGT, ALKPHOS, BILITOTAL, BILIDIRECT, JOSY  OTHER: No results found for: PH, LACT, A1C, TEENA, PHOS, MAG, LIPASE, AMYLASE, TSH, T4, T3, CRP, SED    Anesthesia Plan    ASA Status:  2   NPO Status:  NPO Appropriate    Anesthesia Type: General.     - Airway: ETT   Induction: Intravenous, Propofol.   Maintenance: TIVA.   Techniques and Equipment:     - Lines/Monitors: 2nd IV     Consents    Anesthesia Plan(s) and associated risks, benefits, and realistic alternatives discussed. Questions answered and patient/representative(s) expressed understanding.     - Discussed with:  Patient      - Extended Intubation/Ventilatory Support Discussed: No.      - Patient is DNR/DNI Status: No    Use of blood products discussed: Yes.     - Discussed with: Patient.     - Consented: consented to blood products            Reason for refusal: other.     Postoperative Care    Pain management: IV analgesics, Peripheral nerve block (Single Shot), Multi-modal analgesia.     - Plan for long acting post-op opioid use   PONV prophylaxis: Ondansetron (or other 5HT-3), Dexamethasone or Solumedrol     Comments:    Ketamine  Magnesium  Decadron 10 mg  Precedex    Prn bilateral TAP blocks for post operative pain relief if ordered by the surgeon.  Pt agrees and consents to the plan.    Reverse with Sugammadex  Jimmy Chew MD

## 2021-07-21 NOTE — PHARMACY-ADMISSION MEDICATION HISTORY
Pharmacy Note - Admission Medication History   ______________________________________________________________________    Prior To Admission (PTA) med list completed and updated in EMR.       Prior to Admission Medications   Prescriptions Last Dose Informant Patient Reported? Taking?   cholecalciferol 25 MCG (1000 UT) TABS 7/12/2021  Yes Yes   Sig: Take 1,000 Units by mouth daily   fish oil-omega-3 fatty acids 1000 MG capsule 7/12/2021  Yes Yes   Sig: Take 1 g by mouth daily   lactobacillus rhamnosus, GG, (CULTURELL) capsule 7/12/2021  Yes Yes   Sig: Take 1 capsule by mouth daily   loratadine (CLARITIN) 10 MG tablet 7/12/2021  Yes Yes   Sig: Take 10 mg by mouth daily   vitamin B complex with vitamin C (VITAMIN  B COMPLEX) tablet 7/12/2021  Yes Yes   Sig: Take 1 tablet by mouth daily      Facility-Administered Medications: None       Information source(s): Patient    Method of interview communication: in-person    Patient was asked about OTC/herbal products specifically.  PTA med list reflects this.    Based on the pharmacist's assessment, the PTA med list information appears reliable    Allergies were reviewed, assessed, and updated with the patient.      Patient does not use any multi-dose medications prior to admission.     Thank you for the opportunity to participate in the care of this patient.      Selena Gonzalez RPH     7/21/2021     10:51 AM

## 2021-07-22 ENCOUNTER — RECORDS - HEALTHEAST (OUTPATIENT)
Dept: SCHEDULING | Facility: CLINIC | Age: 56
End: 2021-07-22

## 2021-07-22 VITALS
WEIGHT: 132.4 LBS | RESPIRATION RATE: 18 BRPM | HEART RATE: 75 BPM | HEIGHT: 67 IN | BODY MASS INDEX: 20.78 KG/M2 | DIASTOLIC BLOOD PRESSURE: 60 MMHG | TEMPERATURE: 97.9 F | SYSTOLIC BLOOD PRESSURE: 100 MMHG | OXYGEN SATURATION: 94 %

## 2021-07-22 DIAGNOSIS — Z12.31 OTHER SCREENING MAMMOGRAM: ICD-10-CM

## 2021-07-22 PROCEDURE — 250N000011 HC RX IP 250 OP 636: Performed by: OBSTETRICS & GYNECOLOGY

## 2021-07-22 PROCEDURE — 96372 THER/PROPH/DIAG INJ SC/IM: CPT | Performed by: OBSTETRICS & GYNECOLOGY

## 2021-07-22 PROCEDURE — 250N000013 HC RX MED GY IP 250 OP 250 PS 637: Performed by: OBSTETRICS & GYNECOLOGY

## 2021-07-22 PROCEDURE — 258N000003 HC RX IP 258 OP 636: Performed by: OBSTETRICS & GYNECOLOGY

## 2021-07-22 RX ORDER — ACETAMINOPHEN 325 MG/1
650 TABLET ORAL EVERY 6 HOURS PRN
Qty: 30 TABLET | Refills: 0 | Status: SHIPPED | OUTPATIENT
Start: 2021-07-22 | End: 2021-07-22

## 2021-07-22 RX ORDER — IBUPROFEN 800 MG/1
800 TABLET, FILM COATED ORAL EVERY 8 HOURS PRN
Qty: 30 TABLET | Refills: 0 | Status: SHIPPED | OUTPATIENT
Start: 2021-07-22

## 2021-07-22 RX ORDER — IBUPROFEN 800 MG/1
800 TABLET, FILM COATED ORAL EVERY 6 HOURS PRN
Qty: 30 TABLET | Refills: 0 | Status: SHIPPED | OUTPATIENT
Start: 2021-07-22 | End: 2021-07-22

## 2021-07-22 RX ORDER — ACETAMINOPHEN 325 MG/1
650 TABLET ORAL EVERY 6 HOURS PRN
Qty: 50 TABLET | Refills: 0 | COMMUNITY
Start: 2021-07-22

## 2021-07-22 RX ORDER — OXYCODONE HYDROCHLORIDE 5 MG/1
5 TABLET ORAL EVERY 4 HOURS PRN
Qty: 12 TABLET | Refills: 0 | Status: SHIPPED | OUTPATIENT
Start: 2021-07-22

## 2021-07-22 RX ORDER — ONDANSETRON 4 MG/1
4 TABLET, ORALLY DISINTEGRATING ORAL EVERY 6 HOURS PRN
Qty: 12 TABLET | Refills: 0 | Status: SHIPPED | OUTPATIENT
Start: 2021-07-22

## 2021-07-22 RX ORDER — OXYCODONE HYDROCHLORIDE 5 MG/1
5-10 TABLET ORAL EVERY 4 HOURS PRN
Qty: 12 TABLET | Refills: 0 | Status: SHIPPED | OUTPATIENT
Start: 2021-07-22 | End: 2021-07-22

## 2021-07-22 RX ORDER — ACETAMINOPHEN 325 MG/1
650 TABLET ORAL EVERY 6 HOURS PRN
Qty: 30 TABLET | Refills: 0 | COMMUNITY
Start: 2021-07-22 | End: 2021-07-22

## 2021-07-22 RX ORDER — DOCUSATE SODIUM 100 MG/1
100 CAPSULE, LIQUID FILLED ORAL 2 TIMES DAILY PRN
Qty: 30 CAPSULE | Refills: 0 | Status: SHIPPED | OUTPATIENT
Start: 2021-07-22

## 2021-07-22 RX ADMIN — HEPARIN SODIUM 5000 UNITS: 10000 INJECTION, SOLUTION INTRAVENOUS; SUBCUTANEOUS at 06:45

## 2021-07-22 RX ADMIN — HYDROMORPHONE HYDROCHLORIDE 0.2 MG: 0.2 INJECTION, SOLUTION INTRAMUSCULAR; INTRAVENOUS; SUBCUTANEOUS at 06:40

## 2021-07-22 RX ADMIN — KETOROLAC TROMETHAMINE 15 MG: 15 INJECTION, SOLUTION INTRAMUSCULAR; INTRAVENOUS at 00:42

## 2021-07-22 RX ADMIN — OXYCODONE HYDROCHLORIDE 10 MG: 5 TABLET ORAL at 09:46

## 2021-07-22 RX ADMIN — ONDANSETRON 4 MG: 2 INJECTION INTRAMUSCULAR; INTRAVENOUS at 01:08

## 2021-07-22 RX ADMIN — HYDROMORPHONE HYDROCHLORIDE 0.2 MG: 0.2 INJECTION, SOLUTION INTRAMUSCULAR; INTRAVENOUS; SUBCUTANEOUS at 00:43

## 2021-07-22 RX ADMIN — KETOROLAC TROMETHAMINE 15 MG: 15 INJECTION, SOLUTION INTRAMUSCULAR; INTRAVENOUS at 06:42

## 2021-07-22 RX ADMIN — SODIUM CHLORIDE, POTASSIUM CHLORIDE, SODIUM LACTATE AND CALCIUM CHLORIDE: 600; 310; 30; 20 INJECTION, SOLUTION INTRAVENOUS at 03:38

## 2021-07-22 NOTE — PLAN OF CARE
Problem: Adult Inpatient Plan of Care  Goal: Plan of Care Review  7/22/2021 1202 by Jj Shaw RN  Outcome: Adequate for Discharge  7/22/2021 1127 by Jj Shaw RN  Outcome: Improving  Goal: Patient-Specific Goal (Individualized)  Outcome: Adequate for Discharge  Goal: Absence of Hospital-Acquired Illness or Injury  Outcome: Adequate for Discharge  Goal: Optimal Comfort and Wellbeing  Outcome: Adequate for Discharge  Goal: Readiness for Transition of Care  Outcome: Adequate for Discharge       Discharge paperwork was gone over with patient. Successfully completed the void trial. Paper prescription was given to patient to fill at her pharmacy. Patient stated she had all her belongings upon discharge.

## 2021-07-22 NOTE — PLAN OF CARE
"PRIMARY DIAGNOSIS: \"GENERIC\" NURSING  OUTPATIENT/OBSERVATION GOALS TO BE MET BEFORE DISCHARGE:  ADLs back to baseline: Yes    Activity and level of assistance: Ambulating independently.    Pain status: Improved-controlled with oral pain medications.    Return to near baseline physical activity: Yes     Discharge Planner Nurse   Safe discharge environment identified: Yes  Barriers to discharge: Yes Barriers are peoples catheter needs to be removed and patient needs to void. She will discharge after she accomplishes this.        Entered by: Jj Hubbard 07/22/2021 11:27 AM     Please review provider order for any additional goals.   Nurse to notify provider when observation goals have been met and patient is ready for discharge.  "

## 2021-07-22 NOTE — ANESTHESIA POSTPROCEDURE EVALUATION
Patient: Taina Castro    Procedure(s):  ROBOTIC ASSISTED LAPAROSCOPIC SUPRACERVICAL HYSTERECTOMY, RIGHT SALPINGECTOMY, BILATERAL OOPHORECTOMY, EXSTENSIVE LYSIS OF ADHESIONS, UTEROSACRAL LIGAMENT SUSPENSION DA LUIS ANGEL XI  MIDURETHRAL SLING, CYSTOSCOPY, ANTERIOR COLPORRHAPHY,    Diagnosis:Uterovaginal prolapse, complete [N81.3]  Urinary, incontinence, stress female [N39.3]  Cystocele with rectocele [N81.10, N81.6]  Diagnosis Additional Information: No value filed.    Anesthesia Type:  General    Note:  Disposition: Admission   Postop Pain Control: Uneventful            Sign Out: Well controlled pain   PONV: No   Neuro/Psych: Uneventful            Sign Out: Acceptable/Baseline neuro status   Airway/Respiratory: Uneventful            Sign Out: Acceptable/Baseline resp. status   CV/Hemodynamics: Uneventful            Sign Out: Acceptable CV status; No obvious hypovolemia; No obvious fluid overload   Other NRE: NONE   DID A NON-ROUTINE EVENT OCCUR? No           Last vitals:  Vitals Value Taken Time   /72 07/21/21 2000   Temp 36.6  C (97.9  F) 07/21/21 1930   Pulse 63 07/21/21 2008   Resp 18 07/21/21 1945   SpO2 95 % 07/21/21 2009   Vitals shown include unvalidated device data.    Electronically Signed By: Ruben Ring MD  July 21, 2021  10:04 PM

## 2021-07-22 NOTE — PLAN OF CARE
Problem: Adult Inpatient Plan of Care  Goal: Absence of Hospital-Acquired Illness or Injury  Intervention: Identify and Manage Fall Risk  Recent Flowsheet Documentation  Taken 7/22/2021 0030 by Luciana Jacob, RN  Safety Promotion/Fall Prevention:   bed alarm on   activity supervised  Taken 7/21/2021 2200 by Luciana Jacob, RN  Safety Promotion/Fall Prevention:   bed alarm on   clutter free environment maintained   lighting adjusted   nonskid shoes/slippers when out of bed   patient and family education  Taken 7/21/2021 2030 by Luciana Jacob, RN  Safety Promotion/Fall Prevention:   bed alarm on   activity supervised

## 2021-07-22 NOTE — PLAN OF CARE
Problem: Adult Inpatient Plan of Care  Goal: Absence of Hospital-Acquired Illness or Injury  Outcome: Improving  Intervention: Identify and Manage Fall Risk  Recent Flowsheet Documentation  Taken 7/22/2021 0400 by Luciana Jacob RN  Safety Promotion/Fall Prevention:   bed alarm on   activity supervised  Taken 7/22/2021 0030 by Luciana Jacob, RN  Safety Promotion/Fall Prevention:   bed alarm on   activity supervised  Taken 7/21/2021 2200 by Luciana Jacob, RN  Safety Promotion/Fall Prevention:   bed alarm on   clutter free environment maintained   lighting adjusted   nonskid shoes/slippers when out of bed   patient and family education  Taken 7/21/2021 2030 by Luciana Jacob, RN  Safety Promotion/Fall Prevention:   bed alarm on   activity supervised

## 2021-07-23 LAB
PATH REPORT.COMMENTS IMP SPEC: NORMAL
PATH REPORT.COMMENTS IMP SPEC: NORMAL
PATH REPORT.FINAL DX SPEC: NORMAL
PATH REPORT.GROSS SPEC: NORMAL
PATH REPORT.MICROSCOPIC SPEC OTHER STN: NORMAL
PATH REPORT.RELEVANT HX SPEC: NORMAL
PHOTO IMAGE: NORMAL

## 2021-07-23 PROCEDURE — 88307 TISSUE EXAM BY PATHOLOGIST: CPT | Mod: 26 | Performed by: PATHOLOGY

## 2021-08-21 ENCOUNTER — HEALTH MAINTENANCE LETTER (OUTPATIENT)
Age: 56
End: 2021-08-21

## 2021-10-16 ENCOUNTER — HEALTH MAINTENANCE LETTER (OUTPATIENT)
Age: 56
End: 2021-10-16

## 2022-10-01 ENCOUNTER — HEALTH MAINTENANCE LETTER (OUTPATIENT)
Age: 57
End: 2022-10-01

## 2023-10-15 ENCOUNTER — HEALTH MAINTENANCE LETTER (OUTPATIENT)
Age: 58
End: 2023-10-15

## 2024-10-28 ENCOUNTER — APPOINTMENT (OUTPATIENT)
Dept: CT IMAGING | Facility: CLINIC | Age: 59
End: 2024-10-28
Attending: STUDENT IN AN ORGANIZED HEALTH CARE EDUCATION/TRAINING PROGRAM
Payer: COMMERCIAL

## 2024-10-28 ENCOUNTER — HOSPITAL ENCOUNTER (EMERGENCY)
Facility: CLINIC | Age: 59
Discharge: HOME | End: 2024-10-28
Attending: STUDENT IN AN ORGANIZED HEALTH CARE EDUCATION/TRAINING PROGRAM | Admitting: STUDENT IN AN ORGANIZED HEALTH CARE EDUCATION/TRAINING PROGRAM
Payer: COMMERCIAL

## 2024-10-28 VITALS
RESPIRATION RATE: 16 BRPM | OXYGEN SATURATION: 94 % | BODY MASS INDEX: 20.88 KG/M2 | SYSTOLIC BLOOD PRESSURE: 132 MMHG | HEART RATE: 84 BPM | HEIGHT: 67 IN | DIASTOLIC BLOOD PRESSURE: 72 MMHG | WEIGHT: 133 LBS | TEMPERATURE: 98.3 F

## 2024-10-28 DIAGNOSIS — R10.32 LEFT LOWER QUADRANT ABDOMINAL PAIN: ICD-10-CM

## 2024-10-28 DIAGNOSIS — R11.0 NAUSEA: ICD-10-CM

## 2024-10-28 LAB
ALBUMIN SERPL BCG-MCNC: 4.4 G/DL (ref 3.5–5.2)
ALBUMIN UR-MCNC: NEGATIVE MG/DL
ALP SERPL-CCNC: 52 U/L (ref 40–150)
ALT SERPL W P-5'-P-CCNC: 22 U/L (ref 0–50)
ANION GAP SERPL CALCULATED.3IONS-SCNC: 13 MMOL/L (ref 7–15)
APPEARANCE UR: CLEAR
AST SERPL W P-5'-P-CCNC: 36 U/L (ref 0–45)
BASOPHILS # BLD AUTO: 0 10E3/UL (ref 0–0.2)
BASOPHILS NFR BLD AUTO: 1 %
BILIRUB SERPL-MCNC: 0.3 MG/DL
BILIRUB UR QL STRIP: NEGATIVE
BUN SERPL-MCNC: 9.9 MG/DL (ref 6–20)
CALCIUM SERPL-MCNC: 9 MG/DL (ref 8.8–10.4)
CHLORIDE SERPL-SCNC: 100 MMOL/L (ref 98–107)
COLOR UR AUTO: ABNORMAL
CREAT SERPL-MCNC: 0.79 MG/DL (ref 0.51–0.95)
EGFRCR SERPLBLD CKD-EPI 2021: 87 ML/MIN/1.73M2
EOSINOPHIL # BLD AUTO: 0 10E3/UL (ref 0–0.7)
EOSINOPHIL NFR BLD AUTO: 0 %
ERYTHROCYTE [DISTWIDTH] IN BLOOD BY AUTOMATED COUNT: 12.7 % (ref 10–15)
FLUAV RNA SPEC QL NAA+PROBE: NEGATIVE
FLUBV RNA RESP QL NAA+PROBE: NEGATIVE
GLUCOSE SERPL-MCNC: 120 MG/DL (ref 70–99)
GLUCOSE UR STRIP-MCNC: NEGATIVE MG/DL
HCO3 SERPL-SCNC: 26 MMOL/L (ref 22–29)
HCT VFR BLD AUTO: 45.5 % (ref 35–47)
HGB BLD-MCNC: 15.3 G/DL (ref 11.7–15.7)
HGB UR QL STRIP: NEGATIVE
HOLD SPECIMEN: NORMAL
HOLD SPECIMEN: NORMAL
IMM GRANULOCYTES # BLD: 0 10E3/UL
IMM GRANULOCYTES NFR BLD: 0 %
KETONES UR STRIP-MCNC: 20 MG/DL
LEUKOCYTE ESTERASE UR QL STRIP: NEGATIVE
LYMPHOCYTES # BLD AUTO: 1 10E3/UL (ref 0.8–5.3)
LYMPHOCYTES NFR BLD AUTO: 29 %
MCH RBC QN AUTO: 32 PG (ref 26.5–33)
MCHC RBC AUTO-ENTMCNC: 33.6 G/DL (ref 31.5–36.5)
MCV RBC AUTO: 95 FL (ref 78–100)
MONOCYTES # BLD AUTO: 0.2 10E3/UL (ref 0–1.3)
MONOCYTES NFR BLD AUTO: 5 %
MUCOUS THREADS #/AREA URNS LPF: PRESENT /LPF
NEUTROPHILS # BLD AUTO: 2.3 10E3/UL (ref 1.6–8.3)
NEUTROPHILS NFR BLD AUTO: 65 %
NITRATE UR QL: NEGATIVE
NRBC # BLD AUTO: 0 10E3/UL
NRBC BLD AUTO-RTO: 0 /100
PH UR STRIP: 5 [PH] (ref 5–7)
PLAT MORPH BLD: ABNORMAL
PLATELET # BLD AUTO: 123 10E3/UL (ref 150–450)
POTASSIUM SERPL-SCNC: 4.3 MMOL/L (ref 3.4–5.3)
PROT SERPL-MCNC: 7.3 G/DL (ref 6.4–8.3)
RBC # BLD AUTO: 4.78 10E6/UL (ref 3.8–5.2)
RBC MORPH BLD: ABNORMAL
RBC URINE: 1 /HPF
RSV RNA SPEC NAA+PROBE: NEGATIVE
SARS-COV-2 RNA RESP QL NAA+PROBE: NEGATIVE
SODIUM SERPL-SCNC: 139 MMOL/L (ref 135–145)
SP GR UR STRIP: 1.01 (ref 1–1.03)
SQUAMOUS EPITHELIAL: 8 /HPF
UROBILINOGEN UR STRIP-MCNC: <2 MG/DL
VARIANT LYMPHS BLD QL SMEAR: PRESENT
WBC # BLD AUTO: 3.6 10E3/UL (ref 4–11)
WBC URINE: 5 /HPF

## 2024-10-28 PROCEDURE — 99285 EMERGENCY DEPT VISIT HI MDM: CPT | Mod: 25

## 2024-10-28 PROCEDURE — 80053 COMPREHEN METABOLIC PANEL: CPT | Performed by: STUDENT IN AN ORGANIZED HEALTH CARE EDUCATION/TRAINING PROGRAM

## 2024-10-28 PROCEDURE — 96375 TX/PRO/DX INJ NEW DRUG ADDON: CPT

## 2024-10-28 PROCEDURE — 87637 SARSCOV2&INF A&B&RSV AMP PRB: CPT | Performed by: STUDENT IN AN ORGANIZED HEALTH CARE EDUCATION/TRAINING PROGRAM

## 2024-10-28 PROCEDURE — 36415 COLL VENOUS BLD VENIPUNCTURE: CPT | Performed by: STUDENT IN AN ORGANIZED HEALTH CARE EDUCATION/TRAINING PROGRAM

## 2024-10-28 PROCEDURE — 96376 TX/PRO/DX INJ SAME DRUG ADON: CPT

## 2024-10-28 PROCEDURE — 250N000011 HC RX IP 250 OP 636: Performed by: STUDENT IN AN ORGANIZED HEALTH CARE EDUCATION/TRAINING PROGRAM

## 2024-10-28 PROCEDURE — 96374 THER/PROPH/DIAG INJ IV PUSH: CPT | Mod: 59

## 2024-10-28 PROCEDURE — 85004 AUTOMATED DIFF WBC COUNT: CPT | Performed by: STUDENT IN AN ORGANIZED HEALTH CARE EDUCATION/TRAINING PROGRAM

## 2024-10-28 PROCEDURE — 74177 CT ABD & PELVIS W/CONTRAST: CPT

## 2024-10-28 PROCEDURE — 81003 URINALYSIS AUTO W/O SCOPE: CPT | Performed by: STUDENT IN AN ORGANIZED HEALTH CARE EDUCATION/TRAINING PROGRAM

## 2024-10-28 RX ORDER — ONDANSETRON 2 MG/ML
4 INJECTION INTRAMUSCULAR; INTRAVENOUS ONCE
Status: COMPLETED | OUTPATIENT
Start: 2024-10-28 | End: 2024-10-28

## 2024-10-28 RX ORDER — ONDANSETRON 4 MG/1
4 TABLET, ORALLY DISINTEGRATING ORAL EVERY 8 HOURS PRN
Qty: 10 TABLET | Refills: 0 | Status: SHIPPED | OUTPATIENT
Start: 2024-10-28 | End: 2024-10-31

## 2024-10-28 RX ORDER — KETOROLAC TROMETHAMINE 15 MG/ML
15 INJECTION, SOLUTION INTRAMUSCULAR; INTRAVENOUS ONCE
Status: COMPLETED | OUTPATIENT
Start: 2024-10-28 | End: 2024-10-28

## 2024-10-28 RX ORDER — IOPAMIDOL 755 MG/ML
80 INJECTION, SOLUTION INTRAVASCULAR ONCE
Status: COMPLETED | OUTPATIENT
Start: 2024-10-28 | End: 2024-10-28

## 2024-10-28 RX ADMIN — ONDANSETRON 4 MG: 2 INJECTION, SOLUTION INTRAMUSCULAR; INTRAVENOUS at 22:56

## 2024-10-28 RX ADMIN — ONDANSETRON 4 MG: 2 INJECTION, SOLUTION INTRAMUSCULAR; INTRAVENOUS at 22:00

## 2024-10-28 RX ADMIN — KETOROLAC TROMETHAMINE 15 MG: 15 INJECTION, SOLUTION INTRAMUSCULAR; INTRAVENOUS at 22:01

## 2024-10-28 RX ADMIN — IOPAMIDOL 80 ML: 755 INJECTION, SOLUTION INTRAVENOUS at 21:34

## 2024-10-28 ASSESSMENT — COLUMBIA-SUICIDE SEVERITY RATING SCALE - C-SSRS
1. IN THE PAST MONTH, HAVE YOU WISHED YOU WERE DEAD OR WISHED YOU COULD GO TO SLEEP AND NOT WAKE UP?: NO
6. HAVE YOU EVER DONE ANYTHING, STARTED TO DO ANYTHING, OR PREPARED TO DO ANYTHING TO END YOUR LIFE?: NO
2. HAVE YOU ACTUALLY HAD ANY THOUGHTS OF KILLING YOURSELF IN THE PAST MONTH?: NO

## 2024-10-28 ASSESSMENT — ACTIVITIES OF DAILY LIVING (ADL)
ADLS_ACUITY_SCORE: 0

## 2024-10-29 NOTE — ED PROVIDER NOTES
EMERGENCY DEPARTMENT ENCOUNTER      NAME: Taina Castro  AGE: 58 year old female  YOB: 1965  MRN: [id number]  EVALUATION DATE & TIME: 10/28/2024  8:25 PM    PCP: Trista Leon    ED PROVIDER: Jose Lee MD      Chief Complaint   Patient presents with    Abdominal Pain         FINAL IMPRESSION:  1. Left lower quadrant abdominal pain    2. Nausea          ED COURSE & MEDICAL DECISION MAKING:    Pertinent Labs & Imaging studies reviewed. (See chart for details)  58 year old female presents to the Emergency Department for evaluation of abdominal pain, nausea      Patient is a 58-year-old female with history of prior hysterectomy and appendectomy presents the emergency department for left lower quadrant left-sided abdominal pain for the past few days as well as some chills at home and generalized malaise.  Intermittent abdominal pain localized to the left lower quadrant and left flank for the past 2 days and some nausea without any vomiting.  Does not have any hematuria dysuria.  Has not diarrhea.  No sore throat cough or runny nose.  No known sick contacts.    Exam here patient is well-appearing in no acute distress.  Hemodynamically stable and afebrile.  Saturating well on room air.  Heart is regular in rate and rhythm and lungs are clear without any wheezes or rales.  Has some left lower quadrant tenderness as well as pain left flank with no CVA tenderness.  No guarding or rebound.    Initial differential clues was not limited to urinary tract infection, pyelonephritis, nephrolithiasis, diverticulitis.    Labs reviewed interpret myself.  CBC shows mild leukopenia and thrombocytopenia with some reactive lymphs present.  Comprehensive metabolic panel does not show any significant electrolyte derangements.  Urinalysis not particular suggestive of urinary tract infection with 8 squamous cells but no nitrites and minimal white cells.  COVID and flu are negative.    CT abdomen pelvis does not demonstrate  any acute intra-abdominal process.  There is thickening of the right anterior aspect of the urinary bladder which is similar compared to prior.      Upon repeat evaluation patient's had improvement in her nausea and abdominal pain.  Unclear etiology of her symptoms at this point time but could be related to enteritis.  However at this point time patient is well-appearing do not believe she requires further emergent evaluation or hospitalization.  Patient feels comfortable discharge home.  Will send her home with a short course of Zofran for as needed nausea control at home.  Signs symptoms of worsening condition were discussed and return precautions given.       8:34 PM I met with the patient to obtain patient history and performed a physical exam. Discussed plan for ED work up including potential diagnostic studies and interventions.       Medical Decision Making  Obtained supplemental history:Supplemental history obtained?: No  Reviewed external records: External records reviewed?: No  Care impacted by chronic illness:Documented in Chart  Care significantly affected by social determinants of health:Access to Medical Care  Did you consider but not order tests?: Work up considered but not performed and documented in chart, if applicable  Did you interpret images independently?: Independent interpretation of ECG and images noted in documentation, when applicable.  Consultation discussion with other provider:Did you involve another provider (consultant, , pharmacy, etc.)?: No  Discharge. I prescribed additional prescription strength medication(s) as charted. I considered admission, but discharged patient after significant clinical improvement.  Not Applicable          At the conclusion of the encounter I discussed the results of all of the tests and the disposition. The questions were answered. The patient or family acknowledged understanding and was agreeable with the care plan.     0 minutes of critical care time      MEDICATIONS GIVEN IN THE EMERGENCY:  Medications   ondansetron (ZOFRAN) injection 4 mg (has no administration in time range)   iopamidol (ISOVUE-370) solution 80 mL (80 mLs Intravenous $Given 10/28/24 2134)   ketorolac (TORADOL) injection 15 mg (15 mg Intravenous $Given 10/28/24 2201)   ondansetron (ZOFRAN) injection 4 mg (4 mg Intravenous $Given 10/28/24 2200)       NEW PRESCRIPTIONS STARTED AT TODAY'S ER VISIT  Current Discharge Medication List             =================================================================    HPI    Patient information was obtained from: Patient     Use of : N/A         Taina Castro is a 58 year old female with no pertinent history who presents to this ED by car for evaluation of abdominal pain.    Patient reports left sided abdominal pain. She started to have body aches, chills, and headache as well. The abdominal pain, was waxing and waning but now is constant. She took Advil at 9:30 with no improvement in pain. Patient is nauseous. She has never had this pain before.     Patient denies vomiting, nausea, runny nose, cough, and sick contacts. There were no other complaints/concerns at this time.       REVIEW OF SYSTEMS   Refer to the Hospitals in [place]    PAST MEDICAL HISTORY:  Past Medical History:   Diagnosis Date    Motion sickness        PAST SURGICAL HISTORY:  Past Surgical History:   Procedure Laterality Date    COLPORRHAPY ANTERIOR, CYSTOSCOPY, COMBINED N/A 7/21/2021    Procedure: MIDURETHRAL SLING, CYSTOSCOPY, ANTERIOR COLPORRHAPHY,;  Surgeon: Anitra Stephens MD;  Location: [facility] OR    LAPAROSCOPIC HYSTERECTOMY SUPRACERVICAL Bilateral 7/21/2021    Procedure: ROBOTIC ASSISTED LAPAROSCOPIC SUPRACERVICAL HYSTERECTOMY, RIGHT SALPINGECTOMY, BILATERAL OOPHORECTOMY, EXSTENSIVE LYSIS OF ADHESIONS, UTEROSACRAL LIGAMENT SUSPENSION DA LUIS ANGEL XI;  Surgeon: Anitra Stephens MD;  Location: [facility] OR           CURRENT MEDICATIONS:    acetaminophen  "(TYLENOL) 325 MG tablet  cholecalciferol 25 MCG (1000 UT) TABS  docusate sodium (COLACE) 100 MG capsule  fish oil-omega-3 fatty acids 1000 MG capsule  ibuprofen (ADVIL/MOTRIN) 800 MG tablet  lactobacillus rhamnosus, GG, (CULTURELL) capsule  loratadine (CLARITIN) 10 MG tablet  ondansetron (ZOFRAN ODT) 4 MG ODT tab  oxyCODONE (ROXICODONE) 5 MG tablet  vitamin B complex with vitamin C (VITAMIN  B COMPLEX) tablet        ALLERGIES:  No Known Allergies    FAMILY HISTORY:  Family History   Problem Relation Age of Onset    Breast Cancer Sister        SOCIAL HISTORY:   Social History     Socioeconomic History    Marital status:    Tobacco Use    Smoking status: Never    Smokeless tobacco: Never   Substance and Sexual Activity    Alcohol use: Yes     Comment: occass    Drug use: Never     Social Drivers of Health      Received from [employer]    Financial Resource Strain    Received from [employer]    Social Connections       VITALS:  /68   Pulse 82   Temp 98.3  F (36.8  C) (Oral)   Resp 16   Ht 1.702 m (5' 7\")   Wt 60.3 kg (133 lb)   LMP 07/20/2011   SpO2 96%   BMI 20.83 kg/m      PHYSICAL EXAM    Constitutional: Well developed, Well nourished, NAD,   HENT: Normocephalic, Atraumatic,  mucous membranes moist,   Neck- trachea midline, No stridor.    Eyes:EOMI, Conjunctiva normal, No discharge.   Respiratory: Normal breath sounds, No respiratory distress, No wheezing.    Cardiovascular: Normal heart rate, Regular rhythm,  No murmurs,   Abdominal: Soft, Left flank pain  No rebound or guarding.     Musculoskeletal: no deformity or malalignment   Integument: Warm, Dry, No erythema,    Neurologic: Alert & oriented x 3   Psychiatric: Affect normal, Cooperative.      LAB:  All pertinent labs reviewed and interpreted.  Results for orders placed or performed during the hospital encounter of 10/28/24   CT Abdomen Pelvis w Contrast    Impression    " IMPRESSION:   1.  There is no evidence for hydronephrosis. Normal symmetric enhancement of the kidneys.  2.  Thickening of the right anterior aspect of the urinary bladder is similar to the prior exam. This may be postoperative in nature, however this should be correlated with patient's clinical history and consider urinalysis.  3.  No evidence for bowel obstruction or inflammation.   Extra Green Top (Lithium Heparin) Tube   Result Value Ref Range    Hold Specimen JI    Extra Purple Top Tube   Result Value Ref Range    Hold Specimen JI    Comprehensive metabolic panel   Result Value Ref Range    Sodium 139 135 - 145 mmol/L    Potassium 4.3 3.4 - 5.3 mmol/L    Carbon Dioxide (CO2) 26 22 - 29 mmol/L    Anion Gap 13 7 - 15 mmol/L    Urea Nitrogen 9.9 6.0 - 20.0 mg/dL    Creatinine 0.79 0.51 - 0.95 mg/dL    GFR Estimate 87 >60 mL/min/1.73m2    Calcium 9.0 8.8 - 10.4 mg/dL    Chloride 100 98 - 107 mmol/L    Glucose 120 (H) 70 - 99 mg/dL    Alkaline Phosphatase 52 40 - 150 U/L    AST 36 0 - 45 U/L    ALT 22 0 - 50 U/L    Protein Total 7.3 6.4 - 8.3 g/dL    Albumin 4.4 3.5 - 5.2 g/dL    Bilirubin Total 0.3 <=1.2 mg/dL   UA with Microscopic reflex to Culture    Specimen: Urine, Midstream   Result Value Ref Range    Color Urine Light Yellow Colorless, Straw, Light Yellow, Yellow    Appearance Urine Clear Clear    Glucose Urine Negative Negative mg/dL    Bilirubin Urine Negative Negative    Ketones Urine 20 (A) Negative mg/dL    Specific Gravity Urine 1.014 1.001 - 1.030    Blood Urine Negative Negative    pH Urine 5.0 5.0 - 7.0    Protein Albumin Urine Negative Negative mg/dL    Urobilinogen Urine <2.0 <2.0 mg/dL    Nitrite Urine Negative Negative    Leukocyte Esterase Urine Negative Negative    Mucus Urine Present (A) None Seen /LPF    RBC Urine 1 <=2 /HPF    WBC Urine 5 <=5 /HPF    Squamous Epithelials Urine 8 (H) <=1 /HPF   CBC with platelets and differential   Result Value Ref Range    WBC Count 3.6 (L) 4.0 - 11.0  10e3/uL    RBC Count 4.78 3.80 - 5.20 10e6/uL    Hemoglobin 15.3 11.7 - 15.7 g/dL    Hematocrit 45.5 35.0 - 47.0 %    MCV 95 78 - 100 fL    MCH 32.0 26.5 - 33.0 pg    MCHC 33.6 31.5 - 36.5 g/dL    RDW 12.7 10.0 - 15.0 %    Platelet Count 123 (L) 150 - 450 10e3/uL    % Neutrophils 65 %    % Lymphocytes 29 %    % Monocytes 5 %    % Eosinophils 0 %    % Basophils 1 %    % Immature Granulocytes 0 %    NRBCs per 100 WBC 0 <1 /100    Absolute Neutrophils 2.3 1.6 - 8.3 10e3/uL    Absolute Lymphocytes 1.0 0.8 - 5.3 10e3/uL    Absolute Monocytes 0.2 0.0 - 1.3 10e3/uL    Absolute Eosinophils 0.0 0.0 - 0.7 10e3/uL    Absolute Basophils 0.0 0.0 - 0.2 10e3/uL    Absolute Immature Granulocytes 0.0 <=0.4 10e3/uL    Absolute NRBCs 0.0 10e3/uL   RBC and Platelet Morphology   Result Value Ref Range    RBC Morphology Confirmed RBC Indices     Platelet Assessment  Automated Count Confirmed. Platelet morphology is normal.     Automated Count Confirmed. Platelet morphology is normal.    Reactive Lymphocytes Present (A) None Seen   Symptomatic Influenza A/B, RSV, & SARS-CoV2 PCR (COVID-19) Nasopharyngeal    Specimen: Nasopharyngeal; Swab   Result Value Ref Range    Influenza A PCR Negative Negative    Influenza B PCR Negative Negative    RSV PCR Negative Negative    SARS CoV2 PCR Negative Negative       RADIOLOGY:  Reviewed all pertinent imaging. Please see official radiology report.  CT Abdomen Pelvis w Contrast   Final Result   IMPRESSION:    1.  There is no evidence for hydronephrosis. Normal symmetric enhancement of the kidneys.   2.  Thickening of the right anterior aspect of the urinary bladder is similar to the prior exam. This may be postoperative in nature, however this should be correlated with patient's clinical history and consider urinalysis.   3.  No evidence for bowel obstruction or inflammation.          EKG:          I have independently reviewed and interpreted the EKG(s) documented above.    PROCEDURES:         [facility] Documentation:   CMS Diagnoses:              I, Serge Rivas, am serving as a scribe to document services personally performed by Jose Lee MD based on my observation and the provider's statements to me. I, Jose Lee MD, attest that Serge Rivas is acting in a scribe capacity, has observed my performance of the services and has documented them in accordance with my direction.    Jose Lee MD  [facility] EMERGENCY ROOM  [address] [address] [phone]      Jose Lee MD  10/28/24 0410

## 2024-10-29 NOTE — DISCHARGE INSTRUCTIONS
Fortunately workup in the emergency department today does not show any dangerous causes of your symptoms.  I would continue to monitor and what to expect improvement in the next few days.  You been given a medication called Zofran to help with nausea as needed at home.  If you develop significantly worsening abdominal pain, recurrent and intractable vomiting, or other concerning symptoms please return to the emergency department for repeat evaluation.

## 2024-10-29 NOTE — ED TRIAGE NOTES
"Patient presents to the ED with LLQ pain x2 days. Patient first started to feel \"ill\" two nights ago. Had the chills, general malaise, and then developed a stomach ache with nausea. Last BM on Saturday and was normal looking per patient report.         "

## 2024-10-29 NOTE — ED NOTES
Patient discharged with significant other after reviewing the AVS.  No questions or concerns at this time.  Patient comfortable and agrees with plan.

## 2024-11-02 ENCOUNTER — HOSPITAL ENCOUNTER (INPATIENT)
Facility: CLINIC | Age: 59
LOS: 4 days | Discharge: HOME | DRG: 866 | End: 2024-11-06
Attending: EMERGENCY MEDICINE | Admitting: FAMILY MEDICINE
Payer: COMMERCIAL

## 2024-11-02 ENCOUNTER — APPOINTMENT (OUTPATIENT)
Dept: ULTRASOUND IMAGING | Facility: CLINIC | Age: 59
DRG: 866 | End: 2024-11-02
Attending: EMERGENCY MEDICINE
Payer: COMMERCIAL

## 2024-11-02 DIAGNOSIS — R10.13 ABDOMINAL PAIN, EPIGASTRIC: ICD-10-CM

## 2024-11-02 DIAGNOSIS — B34.9 VIRAL SYNDROME: ICD-10-CM

## 2024-11-02 DIAGNOSIS — T39.1X1A TYLENOL OVERDOSE, ACCIDENTAL OR UNINTENTIONAL, INITIAL ENCOUNTER: ICD-10-CM

## 2024-11-02 DIAGNOSIS — R79.89 ELEVATED LFTS: ICD-10-CM

## 2024-11-02 DIAGNOSIS — M54.50 ACUTE BILATERAL LOW BACK PAIN WITHOUT SCIATICA: Primary | ICD-10-CM

## 2024-11-02 DIAGNOSIS — D72.820 LYMPHOCYTOSIS: ICD-10-CM

## 2024-11-02 LAB
ALBUMIN SERPL BCG-MCNC: 3.6 G/DL (ref 3.5–5.2)
ALP SERPL-CCNC: 291 U/L (ref 40–150)
ALT SERPL W P-5'-P-CCNC: 791 U/L (ref 0–50)
ANION GAP SERPL CALCULATED.3IONS-SCNC: 13 MMOL/L (ref 7–15)
APAP SERPL-MCNC: <5 UG/ML (ref 10–30)
AST SERPL W P-5'-P-CCNC: 728 U/L (ref 0–45)
BASOPHILS # BLD MANUAL: 0 10E3/UL (ref 0–0.2)
BASOPHILS NFR BLD MANUAL: 0 %
BILIRUB SERPL-MCNC: 0.7 MG/DL
BUN SERPL-MCNC: 9 MG/DL (ref 6–20)
CALCIUM SERPL-MCNC: 8.7 MG/DL (ref 8.8–10.4)
CHLORIDE SERPL-SCNC: 100 MMOL/L (ref 98–107)
CK SERPL-CCNC: 35 U/L (ref 26–192)
CREAT SERPL-MCNC: 0.68 MG/DL (ref 0.51–0.95)
CRP SERPL-MCNC: 66.3 MG/L
EGFRCR SERPLBLD CKD-EPI 2021: >90 ML/MIN/1.73M2
EOSINOPHIL # BLD MANUAL: 0 10E3/UL (ref 0–0.7)
EOSINOPHIL NFR BLD MANUAL: 0 %
ERYTHROCYTE [DISTWIDTH] IN BLOOD BY AUTOMATED COUNT: 13 % (ref 10–15)
GLUCOSE SERPL-MCNC: 102 MG/DL (ref 70–99)
HAV IGM SERPL QL IA: NONREACTIVE
HBV CORE IGM SERPL QL IA: NONREACTIVE
HBV SURFACE AG SERPL QL IA: NONREACTIVE
HCO3 SERPL-SCNC: 26 MMOL/L (ref 22–29)
HCT VFR BLD AUTO: 40.6 % (ref 35–47)
HCV AB SERPL QL IA: NONREACTIVE
HGB BLD-MCNC: 13.9 G/DL (ref 11.7–15.7)
INR PPP: 1.02 (ref 0.85–1.15)
LYMPHOCYTES # BLD MANUAL: 6.1 10E3/UL (ref 0.8–5.3)
LYMPHOCYTES NFR BLD MANUAL: 74 %
MCH RBC QN AUTO: 32 PG (ref 26.5–33)
MCHC RBC AUTO-ENTMCNC: 34.2 G/DL (ref 31.5–36.5)
MCV RBC AUTO: 93 FL (ref 78–100)
MONOCYTES # BLD MANUAL: 0.2 10E3/UL (ref 0–1.3)
MONOCYTES NFR BLD AUTO: NEGATIVE %
MONOCYTES NFR BLD MANUAL: 2 %
NEUTROPHILS # BLD MANUAL: 2 10E3/UL (ref 1.6–8.3)
NEUTROPHILS NFR BLD MANUAL: 24 %
PATH REV: ABNORMAL
PLAT MORPH BLD: ABNORMAL
PLATELET # BLD AUTO: 71 10E3/UL (ref 150–450)
POTASSIUM SERPL-SCNC: 3.6 MMOL/L (ref 3.4–5.3)
PROT SERPL-MCNC: 6.7 G/DL (ref 6.4–8.3)
RBC # BLD AUTO: 4.35 10E6/UL (ref 3.8–5.2)
RBC MORPH BLD: ABNORMAL
SODIUM SERPL-SCNC: 139 MMOL/L (ref 135–145)
TROPONIN T SERPL HS-MCNC: <6 NG/L
VARIANT LYMPHS BLD QL SMEAR: PRESENT
WBC # BLD AUTO: 8.2 10E3/UL (ref 4–11)

## 2024-11-02 PROCEDURE — 80053 COMPREHEN METABOLIC PANEL: CPT | Performed by: EMERGENCY MEDICINE

## 2024-11-02 PROCEDURE — 86308 HETEROPHILE ANTIBODY SCREEN: CPT | Performed by: FAMILY MEDICINE

## 2024-11-02 PROCEDURE — 82040 ASSAY OF SERUM ALBUMIN: CPT | Performed by: EMERGENCY MEDICINE

## 2024-11-02 PROCEDURE — 99285 EMERGENCY DEPT VISIT HI MDM: CPT | Mod: 25

## 2024-11-02 PROCEDURE — 84484 ASSAY OF TROPONIN QUANT: CPT | Performed by: EMERGENCY MEDICINE

## 2024-11-02 PROCEDURE — 258N000003 HC RX IP 258 OP 636: Performed by: EMERGENCY MEDICINE

## 2024-11-02 PROCEDURE — 96375 TX/PRO/DX INJ NEW DRUG ADDON: CPT

## 2024-11-02 PROCEDURE — 82550 ASSAY OF CK (CPK): CPT | Performed by: EMERGENCY MEDICINE

## 2024-11-02 PROCEDURE — 86140 C-REACTIVE PROTEIN: CPT | Performed by: EMERGENCY MEDICINE

## 2024-11-02 PROCEDURE — 80143 DRUG ASSAY ACETAMINOPHEN: CPT | Performed by: EMERGENCY MEDICINE

## 2024-11-02 PROCEDURE — 76705 ECHO EXAM OF ABDOMEN: CPT

## 2024-11-02 PROCEDURE — 250N000013 HC RX MED GY IP 250 OP 250 PS 637: Performed by: FAMILY MEDICINE

## 2024-11-02 PROCEDURE — 88184 FLOWCYTOMETRY/ TC 1 MARKER: CPT | Performed by: STUDENT IN AN ORGANIZED HEALTH CARE EDUCATION/TRAINING PROGRAM

## 2024-11-02 PROCEDURE — 82435 ASSAY OF BLOOD CHLORIDE: CPT | Performed by: EMERGENCY MEDICINE

## 2024-11-02 PROCEDURE — 82784 ASSAY IGA/IGD/IGG/IGM EACH: CPT | Performed by: NURSE PRACTITIONER

## 2024-11-02 PROCEDURE — 80074 ACUTE HEPATITIS PANEL: CPT | Performed by: EMERGENCY MEDICINE

## 2024-11-02 PROCEDURE — 85027 COMPLETE CBC AUTOMATED: CPT | Performed by: EMERGENCY MEDICINE

## 2024-11-02 PROCEDURE — 250N000013 HC RX MED GY IP 250 OP 250 PS 637: Performed by: EMERGENCY MEDICINE

## 2024-11-02 PROCEDURE — 87798 DETECT AGENT NOS DNA AMP: CPT | Performed by: EMERGENCY MEDICINE

## 2024-11-02 PROCEDURE — 258N000003 HC RX IP 258 OP 636: Performed by: FAMILY MEDICINE

## 2024-11-02 PROCEDURE — 250N000011 HC RX IP 250 OP 636: Performed by: INTERNAL MEDICINE

## 2024-11-02 PROCEDURE — 120N000001 HC R&B MED SURG/OB

## 2024-11-02 PROCEDURE — 96365 THER/PROPH/DIAG IV INF INIT: CPT

## 2024-11-02 PROCEDURE — 88189 FLOWCYTOMETRY/READ 16 & >: CPT | Mod: GC | Performed by: STUDENT IN AN ORGANIZED HEALTH CARE EDUCATION/TRAINING PROGRAM

## 2024-11-02 PROCEDURE — 250N000011 HC RX IP 250 OP 636: Performed by: EMERGENCY MEDICINE

## 2024-11-02 PROCEDURE — 88185 FLOWCYTOMETRY/TC ADD-ON: CPT | Performed by: EMERGENCY MEDICINE

## 2024-11-02 PROCEDURE — 87040 BLOOD CULTURE FOR BACTERIA: CPT | Performed by: EMERGENCY MEDICINE

## 2024-11-02 PROCEDURE — 99222 1ST HOSP IP/OBS MODERATE 55: CPT | Performed by: FAMILY MEDICINE

## 2024-11-02 PROCEDURE — G0378 HOSPITAL OBSERVATION PER HR: HCPCS

## 2024-11-02 PROCEDURE — 85007 BL SMEAR W/DIFF WBC COUNT: CPT | Performed by: EMERGENCY MEDICINE

## 2024-11-02 PROCEDURE — 36415 COLL VENOUS BLD VENIPUNCTURE: CPT | Performed by: EMERGENCY MEDICINE

## 2024-11-02 PROCEDURE — 85610 PROTHROMBIN TIME: CPT | Performed by: EMERGENCY MEDICINE

## 2024-11-02 RX ORDER — KETOROLAC TROMETHAMINE 15 MG/ML
15 INJECTION, SOLUTION INTRAMUSCULAR; INTRAVENOUS ONCE
Status: COMPLETED | OUTPATIENT
Start: 2024-11-02 | End: 2024-11-02

## 2024-11-02 RX ORDER — CYCLOBENZAPRINE HCL 10 MG
10 TABLET ORAL 3 TIMES DAILY PRN
Status: DISCONTINUED | OUTPATIENT
Start: 2024-11-02 | End: 2024-11-06 | Stop reason: HOSPADM

## 2024-11-02 RX ORDER — ONDANSETRON 2 MG/ML
4 INJECTION INTRAMUSCULAR; INTRAVENOUS EVERY 6 HOURS PRN
Status: DISCONTINUED | OUTPATIENT
Start: 2024-11-02 | End: 2024-11-06 | Stop reason: HOSPADM

## 2024-11-02 RX ORDER — OXYCODONE HYDROCHLORIDE 5 MG/1
5 TABLET ORAL ONCE
Status: COMPLETED | OUTPATIENT
Start: 2024-11-02 | End: 2024-11-02

## 2024-11-02 RX ORDER — SODIUM CHLORIDE 9 MG/ML
INJECTION, SOLUTION INTRAVENOUS CONTINUOUS
Status: DISCONTINUED | OUTPATIENT
Start: 2024-11-02 | End: 2024-11-04

## 2024-11-02 RX ORDER — IBUPROFEN 200 MG
600 TABLET ORAL EVERY 6 HOURS PRN
Status: ON HOLD | COMMUNITY
End: 2024-11-06

## 2024-11-02 RX ORDER — LIDOCAINE 40 MG/G
CREAM TOPICAL
Status: DISCONTINUED | OUTPATIENT
Start: 2024-11-02 | End: 2024-11-06 | Stop reason: HOSPADM

## 2024-11-02 RX ORDER — ONDANSETRON 4 MG/1
4 TABLET, ORALLY DISINTEGRATING ORAL EVERY 6 HOURS PRN
Status: DISCONTINUED | OUTPATIENT
Start: 2024-11-02 | End: 2024-11-06 | Stop reason: HOSPADM

## 2024-11-02 RX ORDER — ESTRADIOL 0.1 MG/G
CREAM VAGINAL
COMMUNITY

## 2024-11-02 RX ORDER — CYCLOBENZAPRINE HCL 10 MG
10 TABLET ORAL ONCE
Status: COMPLETED | OUTPATIENT
Start: 2024-11-02 | End: 2024-11-02

## 2024-11-02 RX ORDER — PANTOPRAZOLE SODIUM 40 MG/1
40 TABLET, DELAYED RELEASE ORAL
Status: DISCONTINUED | OUTPATIENT
Start: 2024-11-02 | End: 2024-11-06 | Stop reason: HOSPADM

## 2024-11-02 RX ORDER — LACTOBACILLUS RHAMNOSUS GG 10B CELL
1 CAPSULE ORAL DAILY
Status: DISCONTINUED | OUTPATIENT
Start: 2024-11-02 | End: 2024-11-06 | Stop reason: HOSPADM

## 2024-11-02 RX ORDER — AMOXICILLIN 250 MG
1 CAPSULE ORAL 2 TIMES DAILY PRN
Status: DISCONTINUED | OUTPATIENT
Start: 2024-11-02 | End: 2024-11-06 | Stop reason: HOSPADM

## 2024-11-02 RX ORDER — FLUTICASONE PROPIONATE 50 MCG
2 SPRAY, SUSPENSION (ML) NASAL DAILY PRN
COMMUNITY

## 2024-11-02 RX ORDER — DESVENLAFAXINE 25 MG/1
25 TABLET, EXTENDED RELEASE ORAL DAILY
Status: DISCONTINUED | OUTPATIENT
Start: 2024-11-02 | End: 2024-11-06 | Stop reason: HOSPADM

## 2024-11-02 RX ORDER — AMOXICILLIN 250 MG
2 CAPSULE ORAL 2 TIMES DAILY PRN
Status: DISCONTINUED | OUTPATIENT
Start: 2024-11-02 | End: 2024-11-06 | Stop reason: HOSPADM

## 2024-11-02 RX ORDER — ACETAMINOPHEN 500 MG
1000 TABLET ORAL EVERY 6 HOURS PRN
Status: ON HOLD | COMMUNITY
End: 2024-11-06

## 2024-11-02 RX ORDER — IBUPROFEN 400 MG/1
400 TABLET, FILM COATED ORAL EVERY 6 HOURS PRN
Status: DISCONTINUED | OUTPATIENT
Start: 2024-11-02 | End: 2024-11-06 | Stop reason: HOSPADM

## 2024-11-02 RX ORDER — DESVENLAFAXINE 25 MG/1
25 TABLET, EXTENDED RELEASE ORAL DAILY
COMMUNITY

## 2024-11-02 RX ADMIN — SODIUM CHLORIDE: 9 INJECTION, SOLUTION INTRAVENOUS at 17:45

## 2024-11-02 RX ADMIN — KETOROLAC TROMETHAMINE 15 MG: 15 INJECTION, SOLUTION INTRAMUSCULAR; INTRAVENOUS at 22:37

## 2024-11-02 RX ADMIN — Medication 1 CAPSULE: at 17:44

## 2024-11-02 RX ADMIN — CYCLOBENZAPRINE 10 MG: 10 TABLET, FILM COATED ORAL at 10:23

## 2024-11-02 RX ADMIN — ACETYLCYSTEINE 11980 MG: 200 INJECTION, SOLUTION INTRAVENOUS at 15:12

## 2024-11-02 RX ADMIN — PANTOPRAZOLE SODIUM 40 MG: 40 TABLET, DELAYED RELEASE ORAL at 17:44

## 2024-11-02 RX ADMIN — KETOROLAC TROMETHAMINE 15 MG: 15 INJECTION, SOLUTION INTRAMUSCULAR; INTRAVENOUS at 10:22

## 2024-11-02 RX ADMIN — ACETYLCYSTEINE 6000 MG: 200 INJECTION, SOLUTION INTRAVENOUS at 19:18

## 2024-11-02 RX ADMIN — OXYCODONE 5 MG: 5 TABLET ORAL at 10:23

## 2024-11-02 RX ADMIN — CYCLOBENZAPRINE 10 MG: 10 TABLET, FILM COATED ORAL at 18:34

## 2024-11-02 RX ADMIN — IBUPROFEN 400 MG: 400 TABLET, FILM COATED ORAL at 17:44

## 2024-11-02 ASSESSMENT — ACTIVITIES OF DAILY LIVING (ADL)
ADLS_ACUITY_SCORE: 0

## 2024-11-02 ASSESSMENT — ENCOUNTER SYMPTOMS
SORE THROAT: 0
CONSTIPATION: 1
DIARRHEA: 0
DYSURIA: 0
HEMATURIA: 0
HEADACHES: 1
NAUSEA: 1
WHEEZING: 0

## 2024-11-02 ASSESSMENT — COLUMBIA-SUICIDE SEVERITY RATING SCALE - C-SSRS
2. HAVE YOU ACTUALLY HAD ANY THOUGHTS OF KILLING YOURSELF IN THE PAST MONTH?: NO
1. IN THE PAST MONTH, HAVE YOU WISHED YOU WERE DEAD OR WISHED YOU COULD GO TO SLEEP AND NOT WAKE UP?: NO
6. HAVE YOU EVER DONE ANYTHING, STARTED TO DO ANYTHING, OR PREPARED TO DO ANYTHING TO END YOUR LIFE?: NO

## 2024-11-02 NOTE — ED TRIAGE NOTES
Patient presents to ED with ongoing sx that have not improved since pt was here on Monday, having body aches and taking Tylenol and Advil without relief, unable to sleep r/t the pain, had chills, now just having sweats.  Valeria Boykin RN.......11/2/2024 9:24 AM     Triage Assessment (Adult)       Row Name 11/02/24 0909          Triage Assessment    Airway WDL WDL        Respiratory WDL    Respiratory WDL WDL        Skin Circulation/Temperature WDL    Skin Circulation/Temperature WDL WDL        Cardiac WDL    Cardiac WDL WDL        Peripheral/Neurovascular WDL    Peripheral Neurovascular WDL WDL        Cognitive/Neuro/Behavioral WDL    Cognitive/Neuro/Behavioral WDL WDL

## 2024-11-02 NOTE — ED PROVIDER NOTES
EMERGENCY DEPARTMENT ENCOUNTER       ED Course & Medical Decision Making     I saw and examined the patient.  IV was established and she was placed on the monitor.  She was given Toradol Flexeril and oxycodone.  Diagnostics ordered.    I was called by hematology for a new change in her peripheral smear with new lymphocytosis.  They did have this examined by pathology and will be ordering flow cytology.        She does have significant elevation in her liver enzymes AST and ALT around the 700s.    Right upper quadrant ultrasound is unremarkable.  Acetaminophen level is negative now, however given her history of the last week it is likely that she had been unintentionally overdosing      I contacted poison control and they recommended starting N-acetylcysteine.    This has been initiated.  She is stable.  Etiology for her viral type syndrome is still unknown but there are a lot of send out tests pending.  Plan is for admission at this time.  I spoke to the hospitalist who accepted the patient.  Patient is stable, feeling better, updated and in agreement    Medical Decision Making  Obtained supplemental history:Supplemental history obtained?: No  Reviewed external records: External records reviewed?: Documented in chart  Care impacted by chronic illness:Documented in Chart  Care significantly affected by social determinants of health:N/A  Did you consider but not order tests?: Work up considered but not performed and documented in chart, if applicable  Did you interpret images independently?: Independent interpretation of ECG and images noted in documentation, when applicable.  Consultation discussion with other provider:Did you involve another provider (consultant, , pharmacy, etc.)?: No  Admit.             Prior to making a final disposition on this patient the results of patient's tests and other diagnostic studies were discussed with the patient. All questions were answered. Patient expressed understanding of the  plan and was amenable to it.    Medications   acetylcysteine (ACETADOTE) 11,980 mg in D5W STEP ONE infusion (11,980 mg Intravenous $New Bag 11/2/24 1512)   acetylcysteine (ACETADOTE) 6,000 mg in D5W STEP TWO infusion (has no administration in time range)   ketorolac (TORADOL) injection 15 mg (15 mg Intravenous $Given 11/2/24 1022)   oxyCODONE (ROXICODONE) tablet 5 mg (5 mg Oral $Given 11/2/24 1023)   cyclobenzaprine (FLEXERIL) tablet 10 mg (10 mg Oral $Given 11/2/24 1023)       Final Impression     1. Elevated LFTs    2. Tylenol overdose, accidental or unintentional, initial encounter    3. Viral syndrome    4. Lymphocytosis            Chief Complaint     Chief Complaint   Patient presents with    Generalized Body Aches       Patient presents to ED with ongoing sx that have not improved since pt was here on Monday, having body aches and taking Tylenol and Advil without relief, unable to sleep r/t the pain, had chills, now just having sweats.  Valeria Boykin RN.......11/2/2024 9:24 AM     Triage Assessment (Adult)       Row Name 11/02/24 0909          Triage Assessment    Airway WDL WDL        Respiratory WDL    Respiratory WDL WDL        Skin Circulation/Temperature WDL    Skin Circulation/Temperature WDL WDL        Cardiac WDL    Cardiac WDL WDL        Peripheral/Neurovascular WDL    Peripheral Neurovascular WDL WDL        Cognitive/Neuro/Behavioral WDL    Cognitive/Neuro/Behavioral WDL WDL                         HPI       Taina M Gloria is a pleasant 58 year old female who came to the ED for body aches, nausea, headache, and chills that have kept her up at night for the past week. She has also been experiencing , fleeting abdominal pain, constipation, and a dry heave without actually throwing anything up. For pain she has been taking ibuprofen and tylenol. These have been helping until recently. They now feel like they are hurting her stomach. Patient has a prior history of hysterectomy and appendectomy. She was  here a few days ago in which her work up was for an infection but all labs were negative. Patient denies diarrhea, vomiting, cough, congestion, dysuria, or hematuria.     Per Chart Review: 10/28/25 [facility] ED visit for nausea and left lower quadrant abdominal pain. CBC shows mild leukopenia and thrombocytopenia with some reactive lymphs present. CT as normal. Comprehensive metabolic panel is normal. Urinalysis not suggestive of urinary tract infection. Covid and flue are negative. Unclear etiology of her symptoms at this point time but could be related to enteritis. Patient's symptoms improved and she was discharged home.    I Adrianna Olsonlaurabeverly am serving as a scribe to document services personally performed by Zain Horan M.D. based on my observation and the provider's statements to me. IZain M.D attest that Adrianna Jeter is acting in a scribe capacity, has observed my performance of the services and has documented them in accordance with my direction.    Past Medical History     Past Medical History:   Diagnosis Date    Motion sickness      Past Surgical History:   Procedure Laterality Date    COLPORRHAPY ANTERIOR, CYSTOSCOPY, COMBINED N/A 7/21/2021    Procedure: MIDURETHRAL SLING, CYSTOSCOPY, ANTERIOR COLPORRHAPHY,;  Surgeon: Anitra Stephens MD;  Location: [facility] OR    LAPAROSCOPIC HYSTERECTOMY SUPRACERVICAL Bilateral 7/21/2021    Procedure: ROBOTIC ASSISTED LAPAROSCOPIC SUPRACERVICAL HYSTERECTOMY, RIGHT SALPINGECTOMY, BILATERAL OOPHORECTOMY, EXSTENSIVE LYSIS OF ADHESIONS, UTEROSACRAL LIGAMENT SUSPENSION DA LUIS ANGEL XI;  Surgeon: Anitra Stephens MD;  Location: [facility] OR     Family History   Problem Relation Age of Onset    Breast Cancer Sister       Social History     Tobacco Use    Smoking status: Never    Smokeless tobacco: Never   Substance Use Topics    Alcohol use: Yes     Comment: occass    Drug use: Never       Relevant past medical, surgical, family  "and social history as documented above, has been reviewed and discussed with patient. No changes or additions, unless otherwise noted in the HPI.    Current Medications     acetaminophen (TYLENOL) 325 MG tablet  cholecalciferol 25 MCG (1000 UT) TABS  docusate sodium (COLACE) 100 MG capsule  fish oil-omega-3 fatty acids 1000 MG capsule  ibuprofen (ADVIL/MOTRIN) 800 MG tablet  lactobacillus rhamnosus, GG, (CULTURELL) capsule  loratadine (CLARITIN) 10 MG tablet  oxyCODONE (ROXICODONE) 5 MG tablet  vitamin B complex with vitamin C (VITAMIN  B COMPLEX) tablet        Allergies     No Known Allergies    Review of Systems     Review of Systems   HENT:  Negative for congestion and sore throat.    Respiratory:  Negative for wheezing.    Gastrointestinal:  Positive for constipation and nausea. Negative for diarrhea.   Genitourinary:  Negative for dysuria and hematuria.   Neurological:  Positive for headaches.        Remainder of systems reviewed, unless noted in HPI all others negative.    Physical Exam     /64   Pulse 89   Temp 97.4  F (36.3  C) (Oral)   Resp 18   Ht 1.702 m (5' 7\")   Wt 59.9 kg (132 lb)   LMP 07/20/2011   SpO2 93%   BMI 20.67 kg/m      Physical Exam  Vitals and nursing note reviewed.   HENT:      Head: Normocephalic.      Nose: Nose normal.   Cardiovascular:      Rate and Rhythm: Normal rate.   Pulmonary:      Effort: Pulmonary effort is normal.   Neurological:      Mental Status: She is alert. Mental status is at baseline.   Psychiatric:         Mood and Affect: Mood normal.             Labs & Imaging         Labs Ordered and Resulted from Time of ED Arrival to Time of ED Departure   COMPREHENSIVE METABOLIC PANEL - Abnormal       Result Value    Sodium 139      Potassium 3.6      Carbon Dioxide (CO2) 26      Anion Gap 13      Urea Nitrogen 9.0      Creatinine 0.68      GFR Estimate >90      Calcium 8.7 (*)     Chloride 100      Glucose 102 (*)     Alkaline Phosphatase 291 (*)      (*)  "     (*)     Protein Total 6.7      Albumin 3.6      Bilirubin Total 0.7     CRP INFLAMMATION - Abnormal    CRP Inflammation 66.30 (*)    CBC WITH PLATELETS AND DIFFERENTIAL - Abnormal    WBC Count 8.2      RBC Count 4.35      Hemoglobin 13.9      Hematocrit 40.6      MCV 93      MCH 32.0      MCHC 34.2      RDW 13.0      Platelet Count 71 (*)    MANUAL DIFFERENTIAL - Abnormal    % Neutrophils 24      % Lymphocytes 74      % Monocytes 2      % Eosinophils 0      % Basophils 0      Absolute Neutrophils 2.0      Absolute Lymphocytes 6.1 (*)     Absolute Monocytes 0.2      Absolute Eosinophils 0.0      Absolute Basophils 0.0      RBC Morphology Confirmed RBC Indices      Platelet Assessment        Value: Automated Count Confirmed. Platelet morphology is normal.    Reactive Lymphocytes Present (*)     Pathologist Review Comments (Blood)        Value: Atypical Lymphocytes seen, Flow Cytometry ordered. Slide reviewed by Dr Paul Teran   ACETAMINOPHEN LEVEL - Abnormal    Acetaminophen <5.0 (*)    TROPONIN T, HIGH SENSITIVITY - Normal    Troponin T, High Sensitivity <6     CK TOTAL - Normal    CK 35     INR - Normal    INR 1.02     ACUTE HEPATITIS PANEL   TICK-BORNE DISEASE PANEL BY PCR   FLOW CYTOMETRY   BLOOD CULTURE         Results for orders placed or performed during the hospital encounter of 11/02/24   US Abdomen Limited    Impression    IMPRESSION:  1.  Trace fluid noted adjacent to the gallbladder, gallbladder otherwise unremarkable.  2.  Mild right pelvocaliectasis.  3.  No other acute findings in the abdomen.       Comprehensive metabolic panel   Result Value Ref Range    Sodium 139 135 - 145 mmol/L    Potassium 3.6 3.4 - 5.3 mmol/L    Carbon Dioxide (CO2) 26 22 - 29 mmol/L    Anion Gap 13 7 - 15 mmol/L    Urea Nitrogen 9.0 6.0 - 20.0 mg/dL    Creatinine 0.68 0.51 - 0.95 mg/dL    GFR Estimate >90 >60 mL/min/1.73m2    Calcium 8.7 (L) 8.8 - 10.4 mg/dL    Chloride 100 98 - 107 mmol/L    Glucose 102 (H) 70  - 99 mg/dL    Alkaline Phosphatase 291 (H) 40 - 150 U/L     (HH) 0 - 45 U/L     (HH) 0 - 50 U/L    Protein Total 6.7 6.4 - 8.3 g/dL    Albumin 3.6 3.5 - 5.2 g/dL    Bilirubin Total 0.7 <=1.2 mg/dL   Result Value Ref Range    Troponin T, High Sensitivity <6 <=14 ng/L   Result Value Ref Range    CRP Inflammation 66.30 (H) <5.00 mg/L   Result Value Ref Range    CK 35 26 - 192 U/L   CBC with platelets and differential   Result Value Ref Range    WBC Count 8.2 4.0 - 11.0 10e3/uL    RBC Count 4.35 3.80 - 5.20 10e6/uL    Hemoglobin 13.9 11.7 - 15.7 g/dL    Hematocrit 40.6 35.0 - 47.0 %    MCV 93 78 - 100 fL    MCH 32.0 26.5 - 33.0 pg    MCHC 34.2 31.5 - 36.5 g/dL    RDW 13.0 10.0 - 15.0 %    Platelet Count 71 (L) 150 - 450 10e3/uL   Manual Differential   Result Value Ref Range    % Neutrophils 24 %    % Lymphocytes 74 %    % Monocytes 2 %    % Eosinophils 0 %    % Basophils 0 %    Absolute Neutrophils 2.0 1.6 - 8.3 10e3/uL    Absolute Lymphocytes 6.1 (H) 0.8 - 5.3 10e3/uL    Absolute Monocytes 0.2 0.0 - 1.3 10e3/uL    Absolute Eosinophils 0.0 0.0 - 0.7 10e3/uL    Absolute Basophils 0.0 0.0 - 0.2 10e3/uL    RBC Morphology Confirmed RBC Indices     Platelet Assessment  Automated Count Confirmed. Platelet morphology is normal.     Automated Count Confirmed. Platelet morphology is normal.    Reactive Lymphocytes Present (A) None Seen    Pathologist Review Comments (Blood)       Atypical Lymphocytes seen, Flow Cytometry ordered. Slide reviewed by Dr Paul Teran   Acetaminophen level   Result Value Ref Range    Acetaminophen <5.0 (L) 10.0 - 30.0 ug/mL   Result Value Ref Range    INR 1.02 0.85 - 1.15       Zain Horan MD  Emergency Medicine  [facility] EMERGENCY ROOM  [address] [address] [phone]  11/2/2024         Zain Horan MD  11/02/24 1548

## 2024-11-02 NOTE — PHARMACY-ADMISSION MEDICATION HISTORY
Pharmacist Admission Medication History    Admission medication history is complete. The information provided in this note is only as accurate as the sources available at the time of the update.    Information Source(s): Patient and CareEverywhere/SureScripts via in-person    Pertinent Information: Patient hasn't been good about taking her supplements the past month.  Ondansetron ODT 4mg prescribed on 10/28/24 patient didn't  prescription yet from her pharmacy    Changes made to PTA medication list:  Added: Desvenlafaxine, estradiol, flonase, sunfiber  Deleted: colace, oxycodone  Changed: ibuprofen changed from prescription to OTC. Tylenol changed from 650mg to 1000 mg    Allergies reviewed with patient and updates made in EHR: yes    Medication History Completed By: Renée Connell RPH 11/2/2024 4:28 PM    PTA Med List   Medication Sig Last Dose/Taking    acetaminophen (TYLENOL) 500 MG tablet Take 1,000 mg by mouth every 6 hours as needed for mild pain. 11/2/2024 at  5:30 AM    cholecalciferol 25 MCG (1000 UT) TABS Take 1,000 Units by mouth daily More than a month    desvenlafaxine succinate (PRISTIQ) 25 MG 24 hr tablet Take 25 mg by mouth daily. 10/26/2024 Morning    estradiol (ESTRACE) 0.1 MG/GM vaginal cream Place vaginally twice a week. Sundays and Thursdays 10/24/2024    fish oil-omega-3 fatty acids 1000 MG capsule Take 1 g by mouth daily More than a month    fluticasone (FLONASE) 50 MCG/ACT nasal spray Spray 2 sprays into both nostrils daily as needed for rhinitis or allergies. 10/30/2024    ibuprofen (ADVIL/MOTRIN) 200 MG tablet Take 600 mg by mouth every 6 hours as needed for pain. 11/1/2024 Bedtime    lactobacillus rhamnosus, GG, (CULTURELL) capsule Take 1 capsule by mouth daily Past Week Morning    loratadine (CLARITIN) 10 MG tablet Take 10 mg by mouth daily as needed. More than a month    UNABLE TO FIND Take 1 Scoop by mouth daily. MEDICATION NAME: Sunfiber 11/1/2024 Morning    vitamin B complex with  vitamin C (VITAMIN  B COMPLEX) tablet Take 1 tablet by mouth daily More than a month Morning

## 2024-11-02 NOTE — ED NOTES
Pt reports bilat flank pain slightly improved from ibuprofen. Rating 4/10. Continues to endorse low back pain. Spoke with Dr Pineda. VORB given for PRN flexeril. Pt medicated per provider order.

## 2024-11-02 NOTE — H&P
[facility] MEDICINE ADMISSION HISTORY AND PHYSICAL     Assessment & Plan       Taina Castro is a 58 year old female with history of depression, emergency department on 10/28 with generalized body ache, left lower quadrant abdominal pain, nausea, abdomen CT scan was unremarkable at that time.  Was discharged home with Tylenol 1000 mg every 6 hours as needed and ibuprofen 600 mg every 6 hours as needed, came with persistent abdominal pain. Found to have elevated LFTs with ,  secondary to Tylenol toxicity, which was normal 10/28.  Poison control is notified.  N-acetylcysteine started.    Elevated LFTs  Possible tylenol toxicity  10/28/2024--LFTs are unremarkable  ,  today  Started on N-acetylcysteine  Poison control is notified  Abdominal ultrasound is unremarkable  Acute hepatitis panel  Moderate alcohol use  GI consult    Thrombocytopenia at 71    Generalized body ache  Low-grade fever  Tickborne disease will be ruled out    Code. Full  DVT prophylaxis  Early ambulation and SCDs  Inpatient admission  Needs to stay in the hospital at least for 2 days for further evaluation and treatment    Clinically Significant Risk Factors Present on Admission                 # Thrombocytopenia: Lowest platelets = 71 in last 2 days, will monitor for bleeding                      Chief Complaint Abdominal pain, nausea, dry heaves     HISTORY     Taina Castro is a 58 year old  female with depression, seen at emergency department on 10/28 with generalized body ache, left lower quadrant abdominal pain, nausea, abdomen CT scan was unremarkable at that time.  Was discharged home with Tylenol 1000 mg every 6 hours as needed and ibuprofen 600 mg every 6 hours as needed, came with persistent abdominal pain.  Found to have elevated LFTs with ,  secondary to Tylenol toxicity, which was followed 10/28.  Poison control is notified.  N-acetylcysteine started.  Still has  generalized body ache, low-grade fever.  Her appetite is down.  Has nausea and dry heaves..  Denies chest pain, breathing difficulty, palpitation, diarrhea, constipation or urinary symptoms.  Drinks alcohol daily 1 to 2 glasses of red wine every day no other substance or smoking tobacco.  Works at school.  No sick contact.  History is provided by the patient.  Spoke with ED physician.    Past Medical History     Past Medical History:  No date: Motion sickness     Surgical History     Past Surgical History:   Procedure Laterality Date     COLPORRHAPY ANTERIOR, CYSTOSCOPY, COMBINED N/A 7/21/2021    Procedure: MIDURETHRAL SLING, CYSTOSCOPY, ANTERIOR COLPORRHAPHY,;  Surgeon: Anitra Stephens MD;  Location: [facility] OR     LAPAROSCOPIC HYSTERECTOMY SUPRACERVICAL Bilateral 7/21/2021    Procedure: ROBOTIC ASSISTED LAPAROSCOPIC SUPRACERVICAL HYSTERECTOMY, RIGHT SALPINGECTOMY, BILATERAL OOPHORECTOMY, EXSTENSIVE LYSIS OF ADHESIONS, UTEROSACRAL LIGAMENT SUSPENSION DA LUIS ANGEL XI;  Surgeon: Anitra Stephens MD;  Location: [facility] [place]     Family History    Reviewed, and   Family History   Problem Relation Age of Onset     Breast Cancer Sister          Social History      Social History     Tobacco Use     Smoking status: Never     Smokeless tobacco: Never   Substance Use Topics     Alcohol use: Yes     Comment: occass     Drug use: Never         Allergies   No Known Allergies  Prior to Admission Medications      Prior to Admission Medications   Prescriptions Last Dose Informant Patient Reported? Taking?   UNABLE TO FIND 11/1/2024 Morning  Yes Yes   Sig: Take 1 Scoop by mouth daily. MEDICATION NAME: Sunfiber   acetaminophen (TYLENOL) 500 MG tablet 11/2/2024 at  5:30 AM  Yes Yes   Sig: Take 1,000 mg by mouth every 6 hours as needed for mild pain.   cholecalciferol 25 MCG (1000 UT) TABS More than a month  Yes Yes   Sig: Take 1,000 Units by mouth daily   desvenlafaxine succinate (PRISTIQ) 25 MG 24 hr  tablet 10/26/2024 Morning  Yes Yes   Sig: Take 25 mg by mouth daily.   estradiol (ESTRACE) 0.1 MG/GM vaginal cream 10/24/2024  Yes Yes   Sig: Place vaginally twice a week. Sundays and Thursdays   fish oil-omega-3 fatty acids 1000 MG capsule More than a month  Yes Yes   Sig: Take 1 g by mouth daily   fluticasone (FLONASE) 50 MCG/ACT nasal spray 10/30/2024  Yes Yes   Sig: Spray 2 sprays into both nostrils daily as needed for rhinitis or allergies.   ibuprofen (ADVIL/MOTRIN) 200 MG tablet 11/1/2024 Bedtime  Yes Yes   Sig: Take 600 mg by mouth every 6 hours as needed for pain.   lactobacillus rhamnosus, GG, (CULTURELL) capsule Past Week Morning  Yes Yes   Sig: Take 1 capsule by mouth daily   loratadine (CLARITIN) 10 MG tablet More than a month  Yes Yes   Sig: Take 10 mg by mouth daily as needed.   vitamin B complex with vitamin C (VITAMIN  B COMPLEX) tablet More than a month Morning  Yes Yes   Sig: Take 1 tablet by mouth daily      Facility-Administered Medications: None      Review of Systems     A 12 point comprehensive review of systems was negative except as noted above in HPI.    PHYSICAL EXAMINATION       Vitals      Temp:  [97.4  F (36.3  C)] 97.4  F (36.3  C)  Pulse:  [] 104  Resp:  [18] 18  BP: (110-131)/(62-84) 124/78  SpO2:  [92 %-99 %] 95 %    Examination     GENERAL:  Alert, appears comfortable, in no acute distress, appears stated age   HEAD:  Normocephalic, without obvious abnormality, atraumatic   EYES:  PERRL, conjunctiva clear,  EOM's intact   NOSE: Nares normal,  mucosa normal, no drainage   THROAT: Lips, mucosa,  gums normal, mouth moist   NECK: Supple, symmetrical, trachea midline   BACK:   Symmetric, no curvature, ROM normal   LUNGS:   Clear to auscultation bilaterally, no rales, rhonchi, or wheezing, symmetric chest rise on inhalation, respirations unlabored   CHEST WALL:  No tenderness or deformity   HEART:  Regular rate and rhythm, S1 and S2 normal, no murmur    ABDOMEN:   Soft, mild  diffuse tenderness, bowel sounds active , no masses, no organomegaly, no rebound or guarding   EXTREMITIES: No BLE edema    SKIN: No rashes in the visualized areas   NEURO: Alert, oriented x 4, moves all four extremities freely, non-focal   PSYCH: Cooperative, behavior is appropriate      Pertinent Lab   Most Recent 3 CBC's:  Recent Labs   Lab Test 11/02/24  1021 10/28/24  2017   WBC 8.2 3.6*   HGB 13.9 15.3   MCV 93 95   PLT 71* 123*     Most Recent 3 BMP's:  Recent Labs   Lab Test 11/02/24  1021 10/28/24  2017    139   POTASSIUM 3.6 4.3   CHLORIDE 100 100   CO2 26 26   BUN 9.0 9.9   CR 0.68 0.79   ANIONGAP 13 13   TEENA 8.7* 9.0   * 120*     Most Recent 2 LFT's:  Recent Labs   Lab Test 11/02/24  1021 10/28/24  2017   * 36   * 22   ALKPHOS 291* 52   BILITOTAL 0.7 0.3     Most Recent Urinalysis:  Recent Labs   Lab Test 10/28/24  2049   COLOR Light Yellow   APPEARANCE Clear   URINEGLC Negative   URINEBILI Negative   URINEKETONE 20*   SG 1.014   UBLD Negative   URINEPH 5.0   PROTEIN Negative   NITRITE Negative   LEUKEST Negative   RBCU 1   WBCU 5         Pertinent Radiology     Radiology Results:   Abdomen us  1.  Trace fluid noted adjacent to the gallbladder, gallbladder otherwise unremarkable.  2.  Mild right pelvocaliectasis.  3.  No other acute findings in the abdomen.     Abdomen ct  1.  There is no evidence for hydronephrosis. Normal symmetric enhancement of the kidneys.  2.  Thickening of the right anterior aspect of the urinary bladder is similar to the prior exam. This may be postoperative in nature, however this should be correlated with patient's clinical history and consider urinalysis.  3.  No evidence for bowel obstruction or inflammation.    Total time spent 70 min  Tigist Pineda MD  [facility]   Phone: #[phone]

## 2024-11-02 NOTE — ED NOTES
Call received from poison control. Updated on lab draw for 0600 tomorrow. Updated on INR lab result. Poison control will call back tomorrow for updated am labs.

## 2024-11-03 LAB
A PHAGOCYTOPH DNA BLD QL NAA+PROBE: NOT DETECTED
ALBUMIN SERPL BCG-MCNC: 3.2 G/DL (ref 3.5–5.2)
ALP SERPL-CCNC: 230 U/L (ref 40–150)
ALT SERPL W P-5'-P-CCNC: 631 U/L (ref 0–50)
ANION GAP SERPL CALCULATED.3IONS-SCNC: 12 MMOL/L (ref 7–15)
AST SERPL W P-5'-P-CCNC: 451 U/L (ref 0–45)
BABESIA DNA BLD QL NAA+PROBE: NOT DETECTED
BILIRUB SERPL-MCNC: 0.5 MG/DL
BUN SERPL-MCNC: 6.3 MG/DL (ref 6–20)
CALCIUM SERPL-MCNC: 8.1 MG/DL (ref 8.8–10.4)
CHLORIDE SERPL-SCNC: 101 MMOL/L (ref 98–107)
CK SERPL-CCNC: 25 U/L (ref 26–192)
CREAT SERPL-MCNC: 0.6 MG/DL (ref 0.51–0.95)
EGFRCR SERPLBLD CKD-EPI 2021: >90 ML/MIN/1.73M2
EHRLICHIA DNA SPEC QL NAA+PROBE: NOT DETECTED
ERYTHROCYTE [DISTWIDTH] IN BLOOD BY AUTOMATED COUNT: 13.2 % (ref 10–15)
FERRITIN SERPL-MCNC: 3504 NG/ML (ref 11–328)
GLUCOSE SERPL-MCNC: 125 MG/DL (ref 70–99)
HCO3 SERPL-SCNC: 25 MMOL/L (ref 22–29)
HCT VFR BLD AUTO: 36.6 % (ref 35–47)
HGB BLD-MCNC: 12.4 G/DL (ref 11.7–15.7)
HOLD SPECIMEN: NORMAL
INR PPP: 1.13 (ref 0.85–1.15)
IRON BINDING CAPACITY (ROCHE): 203 UG/DL (ref 240–430)
IRON SATN MFR SERPL: 46 % (ref 15–46)
IRON SERPL-MCNC: 94 UG/DL (ref 37–145)
MAGNESIUM SERPL-MCNC: 1.8 MG/DL (ref 1.7–2.3)
MCH RBC QN AUTO: 31.6 PG (ref 26.5–33)
MCHC RBC AUTO-ENTMCNC: 33.9 G/DL (ref 31.5–36.5)
MCV RBC AUTO: 93 FL (ref 78–100)
PHOSPHATE SERPL-MCNC: 3.6 MG/DL (ref 2.5–4.5)
PLATELET # BLD AUTO: 84 10E3/UL (ref 150–450)
POTASSIUM SERPL-SCNC: 3 MMOL/L (ref 3.4–5.3)
POTASSIUM SERPL-SCNC: 3.5 MMOL/L (ref 3.4–5.3)
PROT SERPL-MCNC: 6 G/DL (ref 6.4–8.3)
RBC # BLD AUTO: 3.93 10E6/UL (ref 3.8–5.2)
SODIUM SERPL-SCNC: 138 MMOL/L (ref 135–145)
WBC # BLD AUTO: 10.2 10E3/UL (ref 4–11)

## 2024-11-03 PROCEDURE — 36415 COLL VENOUS BLD VENIPUNCTURE: CPT | Performed by: INTERNAL MEDICINE

## 2024-11-03 PROCEDURE — 99233 SBSQ HOSP IP/OBS HIGH 50: CPT | Performed by: FAMILY MEDICINE

## 2024-11-03 PROCEDURE — 258N000003 HC RX IP 258 OP 636: Performed by: FAMILY MEDICINE

## 2024-11-03 PROCEDURE — 82103 ALPHA-1-ANTITRYPSIN TOTAL: CPT | Performed by: NURSE PRACTITIONER

## 2024-11-03 PROCEDURE — 85048 AUTOMATED LEUKOCYTE COUNT: CPT | Performed by: FAMILY MEDICINE

## 2024-11-03 PROCEDURE — 82247 BILIRUBIN TOTAL: CPT | Performed by: EMERGENCY MEDICINE

## 2024-11-03 PROCEDURE — 250N000013 HC RX MED GY IP 250 OP 250 PS 637: Performed by: FAMILY MEDICINE

## 2024-11-03 PROCEDURE — 84295 ASSAY OF SERUM SODIUM: CPT | Performed by: EMERGENCY MEDICINE

## 2024-11-03 PROCEDURE — 86015 ACTIN ANTIBODY EACH: CPT | Performed by: NURSE PRACTITIONER

## 2024-11-03 PROCEDURE — 83735 ASSAY OF MAGNESIUM: CPT | Performed by: INTERNAL MEDICINE

## 2024-11-03 PROCEDURE — 36415 COLL VENOUS BLD VENIPUNCTURE: CPT | Performed by: FAMILY MEDICINE

## 2024-11-03 PROCEDURE — 83540 ASSAY OF IRON: CPT | Performed by: NURSE PRACTITIONER

## 2024-11-03 PROCEDURE — 82390 ASSAY OF CERULOPLASMIN: CPT | Performed by: NURSE PRACTITIONER

## 2024-11-03 PROCEDURE — 82550 ASSAY OF CK (CPK): CPT | Performed by: INTERNAL MEDICINE

## 2024-11-03 PROCEDURE — 85610 PROTHROMBIN TIME: CPT | Performed by: EMERGENCY MEDICINE

## 2024-11-03 PROCEDURE — 82374 ASSAY BLOOD CARBON DIOXIDE: CPT | Performed by: EMERGENCY MEDICINE

## 2024-11-03 PROCEDURE — 120N000001 HC R&B MED SURG/OB

## 2024-11-03 PROCEDURE — 86038 ANTINUCLEAR ANTIBODIES: CPT | Performed by: NURSE PRACTITIONER

## 2024-11-03 PROCEDURE — 85014 HEMATOCRIT: CPT | Performed by: FAMILY MEDICINE

## 2024-11-03 PROCEDURE — 87799 DETECT AGENT NOS DNA QUANT: CPT | Performed by: NURSE PRACTITIONER

## 2024-11-03 PROCEDURE — 250N000013 HC RX MED GY IP 250 OP 250 PS 637: Performed by: INTERNAL MEDICINE

## 2024-11-03 PROCEDURE — 84132 ASSAY OF SERUM POTASSIUM: CPT | Performed by: FAMILY MEDICINE

## 2024-11-03 PROCEDURE — 82728 ASSAY OF FERRITIN: CPT | Performed by: NURSE PRACTITIONER

## 2024-11-03 PROCEDURE — 83550 IRON BINDING TEST: CPT | Performed by: NURSE PRACTITIONER

## 2024-11-03 PROCEDURE — 86381 MITOCHONDRIAL ANTIBODY EACH: CPT | Performed by: NURSE PRACTITIONER

## 2024-11-03 PROCEDURE — 36415 COLL VENOUS BLD VENIPUNCTURE: CPT | Performed by: NURSE PRACTITIONER

## 2024-11-03 PROCEDURE — 84100 ASSAY OF PHOSPHORUS: CPT | Performed by: INTERNAL MEDICINE

## 2024-11-03 RX ORDER — NALOXONE HYDROCHLORIDE 0.4 MG/ML
0.2 INJECTION, SOLUTION INTRAMUSCULAR; INTRAVENOUS; SUBCUTANEOUS
Status: DISCONTINUED | OUTPATIENT
Start: 2024-11-03 | End: 2024-11-06 | Stop reason: HOSPADM

## 2024-11-03 RX ORDER — POTASSIUM CHLORIDE 1500 MG/1
20 TABLET, EXTENDED RELEASE ORAL ONCE
Status: COMPLETED | OUTPATIENT
Start: 2024-11-03 | End: 2024-11-03

## 2024-11-03 RX ORDER — MAGNESIUM OXIDE 400 MG/1
400 TABLET ORAL EVERY 4 HOURS
Status: COMPLETED | OUTPATIENT
Start: 2024-11-03 | End: 2024-11-03

## 2024-11-03 RX ORDER — POTASSIUM CHLORIDE 1500 MG/1
40 TABLET, EXTENDED RELEASE ORAL ONCE
Status: COMPLETED | OUTPATIENT
Start: 2024-11-03 | End: 2024-11-03

## 2024-11-03 RX ORDER — OXYCODONE HYDROCHLORIDE 5 MG/1
5 TABLET ORAL EVERY 6 HOURS PRN
Status: DISCONTINUED | OUTPATIENT
Start: 2024-11-03 | End: 2024-11-06 | Stop reason: HOSPADM

## 2024-11-03 RX ORDER — LIDOCAINE 4 G/G
1 PATCH TOPICAL
Status: DISCONTINUED | OUTPATIENT
Start: 2024-11-03 | End: 2024-11-06 | Stop reason: HOSPADM

## 2024-11-03 RX ORDER — OXYCODONE HYDROCHLORIDE 5 MG/1
5 TABLET ORAL ONCE
Status: COMPLETED | OUTPATIENT
Start: 2024-11-03 | End: 2024-11-03

## 2024-11-03 RX ORDER — NALOXONE HYDROCHLORIDE 0.4 MG/ML
0.4 INJECTION, SOLUTION INTRAMUSCULAR; INTRAVENOUS; SUBCUTANEOUS
Status: DISCONTINUED | OUTPATIENT
Start: 2024-11-03 | End: 2024-11-06 | Stop reason: HOSPADM

## 2024-11-03 RX ADMIN — OXYCODONE 5 MG: 5 TABLET ORAL at 09:32

## 2024-11-03 RX ADMIN — IBUPROFEN 400 MG: 400 TABLET, FILM COATED ORAL at 03:30

## 2024-11-03 RX ADMIN — CYCLOBENZAPRINE 10 MG: 10 TABLET, FILM COATED ORAL at 13:01

## 2024-11-03 RX ADMIN — PANTOPRAZOLE SODIUM 40 MG: 40 TABLET, DELAYED RELEASE ORAL at 06:33

## 2024-11-03 RX ADMIN — Medication 1 CAPSULE: at 09:33

## 2024-11-03 RX ADMIN — Medication 400 MG: at 15:44

## 2024-11-03 RX ADMIN — Medication 400 MG: at 11:00

## 2024-11-03 RX ADMIN — OXYCODONE 5 MG: 5 TABLET ORAL at 22:24

## 2024-11-03 RX ADMIN — CYCLOBENZAPRINE 10 MG: 10 TABLET, FILM COATED ORAL at 19:01

## 2024-11-03 RX ADMIN — SODIUM CHLORIDE: 9 INJECTION, SOLUTION INTRAVENOUS at 13:01

## 2024-11-03 RX ADMIN — Medication 400 MG: at 09:32

## 2024-11-03 RX ADMIN — POTASSIUM CHLORIDE 20 MEQ: 1500 TABLET, EXTENDED RELEASE ORAL at 11:00

## 2024-11-03 RX ADMIN — OXYCODONE 5 MG: 5 TABLET ORAL at 03:51

## 2024-11-03 RX ADMIN — POTASSIUM CHLORIDE 40 MEQ: 1500 TABLET, EXTENDED RELEASE ORAL at 09:38

## 2024-11-03 RX ADMIN — LIDOCAINE 1 PATCH: 4 PATCH TOPICAL at 16:18

## 2024-11-03 RX ADMIN — CYCLOBENZAPRINE 10 MG: 10 TABLET, FILM COATED ORAL at 00:51

## 2024-11-03 RX ADMIN — Medication 400 MG: at 19:01

## 2024-11-03 RX ADMIN — IBUPROFEN 400 MG: 400 TABLET, FILM COATED ORAL at 12:16

## 2024-11-03 RX ADMIN — PANTOPRAZOLE SODIUM 40 MG: 40 TABLET, DELAYED RELEASE ORAL at 15:44

## 2024-11-03 RX ADMIN — POTASSIUM CHLORIDE 20 MEQ: 1500 TABLET, EXTENDED RELEASE ORAL at 15:44

## 2024-11-03 RX ADMIN — IBUPROFEN 400 MG: 400 TABLET, FILM COATED ORAL at 19:01

## 2024-11-03 RX ADMIN — SODIUM CHLORIDE: 9 INJECTION, SOLUTION INTRAVENOUS at 03:32

## 2024-11-03 RX ADMIN — OXYCODONE 5 MG: 5 TABLET ORAL at 16:17

## 2024-11-03 NOTE — PROGRESS NOTES
0833: [facility] Discussed about labs trending down, AST, ALT, INR. Acetylcysteine to be stopped after bag finishes. Poison control stated signing off.

## 2024-11-03 NOTE — PLAN OF CARE
Alert and oriented x4. Patient complain of lower back pain. Flexeril given for pain. Somewhat effective. Pt not able to sleep d/t body aches. VSS.   Problem: Adult Inpatient Plan of Care  Goal: Readiness for Transition of Care  Outcome: Progressing     Problem: Pain Acute  Goal: Optimal Pain Control and Function  Outcome: Progressing

## 2024-11-03 NOTE — CONSULTS
GASTROENTEROLOGY CONSULTATION     Taina Castro   [address] [facility] [address]   58 year old female   Admission Date/Time: 11/2/2024   Encounter Date: 11/3/2024  Primary Care Provider: Trista Leon     Referring / Attending Physician: Dr. Tigist Pineda   We were asked to see the patient in consultation by Dr. Tigist Pineda for evaluation of abnormal LFT's .     HPI: Taina Castro is a 58 year old female who noted onset of body aches, chills,sweats, insomnia, malaise, dry heaving and lower abdominal pain which began last Monday. She was using Ibuprofen and tylenol for pain management but due to uncontrolled symptoms she was seen in the  emergency room 10/28/2024. She was noted to be afebrile. CBC noting mild leukopenia, thrombocytopenia with some reactive lymphs present. CMP within acceptable limits and urinalysis not suggesting UTI. COVID and influenza swabs negative. CT unrevealing. She was discharged on scheduled tylenol and ibuprofen as well as a prescription for Zofran.   The symptoms persisted and she returned to the ER with findings of elevated AST of 728, , Alk phos 291, bilirubin total of 0.7, CRP of 66. WBC now normal and again noted to have thrombocytopenia with level of 71. There were concerns of tylenol toxicity and NAC was started under the direction of the poison control. The LFT levels have improved today with plans to discontinue. She does not feel she took more than the recommended doses. The level less than 5 on admission.   She has never had abnormal platelets or LFT's. She does not drink alcohol. There is a family history of autoimmune disease. Acute hepatitis panel is normal. Tick born illness blood work pending, blood cultures pending. INR is normal. She walks her dog regularly and is not aware of tick exposure. She is beginning to feel better. No right upper quadrant pain. She is tolerating some oral intake but decreased appetite. She has been started on omeprazole..  US unrevealing.     Additional symptoms of upper face swelling and lower abdominal cramping. No diarrhea. These symptoms have improved.     She did not check her temp at home and has not had a fever since admission.   Abdominal exam without tenderness.     There is family history of autoimmune disease. No new medications have been started recently.     Colonoscopy 11/3023 with 2 adenomatous polyps and 2018 noting sessile serrated adenoma.     Past Medical History  Past Medical History:   Diagnosis Date    Motion sickness        Past Surgical History  Past Surgical History:   Procedure Laterality Date    COLPORRHAPY ANTERIOR, CYSTOSCOPY, COMBINED N/A 7/21/2021    Procedure: MIDURETHRAL SLING, CYSTOSCOPY, ANTERIOR COLPORRHAPHY,;  Surgeon: Anitra Stephens MD;  Location: [facility] OR    LAPAROSCOPIC HYSTERECTOMY SUPRACERVICAL Bilateral 7/21/2021    Procedure: ROBOTIC ASSISTED LAPAROSCOPIC SUPRACERVICAL HYSTERECTOMY, RIGHT SALPINGECTOMY, BILATERAL OOPHORECTOMY, EXSTENSIVE LYSIS OF ADHESIONS, UTEROSACRAL LIGAMENT SUSPENSION DA LUIS ANGEL XI;  Surgeon: Anitra Stephens MD;  Location: [facility] OR       Family History  Family History   Problem Relation Age of Onset    Breast Cancer Sister        Social History  Social History     Socioeconomic History    Marital status:      Spouse name: Not on file    Number of children: Not on file    Years of education: Not on file    Highest education level: Not on file   Occupational History    Not on file   Tobacco Use    Smoking status: Never    Smokeless tobacco: Never   Substance and Sexual Activity    Alcohol use: Yes     Comment: occass    Drug use: Never    Sexual activity: Not on file   Other Topics Concern    Not on file   Social History Narrative    Not on file     Social Drivers of Health     Financial Resource Strain: Low Risk  (11/2/2024)    Financial Resource Strain     Within the past 12 months, have you or your family members you live  with been unable to get utilities (heat, electricity) when it was really needed?: No   Food Insecurity: Low Risk  (11/2/2024)    Food Insecurity     Within the past 12 months, did you worry that your food would run out before you got money to buy more?: No     Within the past 12 months, did the food you bought just not last and you didn t have money to get more?: No   Transportation Needs: Low Risk  (11/2/2024)    Transportation Needs     Within the past 12 months, has lack of transportation kept you from medical appointments, getting your medicines, non-medical meetings or appointments, work, or from getting things that you need?: No   Physical Activity: Not on file   Stress: Not on file   Social Connections: Unknown (1/1/2022)    Received from [facility]    Social Connections     Frequency of Communication with Friends and Family: Not on file   Interpersonal Safety: Low Risk  (11/2/2024)    Interpersonal Safety     Do you feel physically and emotionally safe where you currently live?: Yes     Within the past 12 months, have you been hit, slapped, kicked or otherwise physically hurt by someone?: No     Within the past 12 months, have you been humiliated or emotionally abused in other ways by your partner or ex-partner?: No   Housing Stability: Low Risk  (11/2/2024)    Housing Stability     Do you have housing? : Yes     Are you worried about losing your housing?: No       Medications  Prior to Admission medications    Medication Sig Start Date End Date Taking? Authorizing Provider   acetaminophen (TYLENOL) 500 MG tablet Take 1,000 mg by mouth every 6 hours as needed for mild pain.   Yes Unknown, Entered By History   cholecalciferol 25 MCG (1000 UT) TABS Take 1,000 Units by mouth daily   Yes Unknown, Entered By History   desvenlafaxine succinate (PRISTIQ) 25 MG 24 hr tablet Take 25 mg by mouth daily.   Yes Unknown, Entered By History   estradiol (ESTRACE) 0.1 MG/GM vaginal cream  "Place vaginally twice a week. Sundays and Thursdays   Yes Unknown, Entered By History   fish oil-omega-3 fatty acids 1000 MG capsule Take 1 g by mouth daily   Yes Unknown, Entered By History   fluticasone (FLONASE) 50 MCG/ACT nasal spray Spray 2 sprays into both nostrils daily as needed for rhinitis or allergies.   Yes Unknown, Entered By History   ibuprofen (ADVIL/MOTRIN) 200 MG tablet Take 600 mg by mouth every 6 hours as needed for pain.   Yes Unknown, Entered By History   lactobacillus rhamnosus, GG, (CULTURELL) capsule Take 1 capsule by mouth daily   Yes Unknown, Entered By History   loratadine (CLARITIN) 10 MG tablet Take 10 mg by mouth daily as needed.   Yes Unknown, Entered By History   UNABLE TO FIND Take 1 Scoop by mouth daily. MEDICATION NAME: Sunfiber   Yes Unknown, Entered By History   vitamin B complex with vitamin C (VITAMIN  B COMPLEX) tablet Take 1 tablet by mouth daily   Yes Unknown, Entered By History       Allergies:  Patient has no known allergies.    ROS: A ten point review of systems was obtained and negative other than the symptoms noted above in the HPI.     Physical Exam:   /60 (BP Location: Right arm)   Pulse 85   Temp 98.6  F (37  C) (Oral)   Resp 16   Ht 1.702 m (5' 7\")   Wt 59.9 kg (132 lb)   LMP 07/20/2011   SpO2 96%   BMI 20.67 kg/m     Constitutional: ill appearing female. alert, oriented x 3,  no acute distress  Cardiovascular: regular, rate and rhythm  Respiratory: respirations non labored.   Psychiatric: normal pleasant affect  Head: atraumatic, normocephalic  ENT: mucous membranes are moist  Abdomen: soft, non-tender, non-distended, normally active bowel sound. Neuro: Neurologically intact grossly  Skin: warm, dry, no rashes are noted    ADDITIONAL COMMENTS:   I reviewed the patient's new clinical lab test results.   Recent Labs   Lab Test 11/03/24  0620 11/02/24  1425 11/02/24  1021 10/28/24  2017   WBC 10.2  --  8.2 3.6*   HGB 12.4  --  13.9 15.3   MCV 93  --  93 " 95   PLT 84*  --  71* 123*   INR 1.13 1.02  --   --       Recent Labs   Lab Test 11/03/24  0620 11/02/24  1021 10/28/24  2017    139 139   POTASSIUM 3.0* 3.6 4.3   CHLORIDE 101 100 100   CO2 25 26 26   BUN 6.3 9.0 9.9   CR 0.60 0.68 0.79   ANIONGAP 12 13 13   TEENA 8.1* 8.7* 9.0      Recent Labs   Lab Test 11/03/24  0620 11/02/24  1021 10/28/24  2049 10/28/24  2017   ALBUMIN 3.2* 3.6  --  4.4   BILITOTAL 0.5 0.7  --  0.3   * 791*  --  22   * 728*  --  36   ALKPHOS 230* 291*  --  52   PROTEIN  --   --  Negative  --        I reviewed the patient's new imaging results.   EXAM: US ABDOMEN LIMITED  LOCATION: [facility]  DATE: 11/2/2024   INDICATION: critical high AST, ALT  COMPARISON: 10/28/2024  TECHNIQUE: Limited abdominal ultrasound.   FINDINGS:   GALLBLADDER: Trace fluid noted by the neck of the gallbladder. Gallbladder otherwise within normal limits. Sonographic Richardson sign is negative. Gallbladder wall within normal thickness.   BILE DUCTS: No biliary dilatation. The common duct measures 5 mm.   LIVER: Normal parenchyma with smooth contour. No focal mass.   RIGHT KIDNEY: Mild right pelvocaliectasis.   PANCREAS: The visualized portions are normal.   No ascites.                                                                 IMPRESSION:  1.  Trace fluid noted adjacent to the gallbladder, gallbladder otherwise unremarkable.  2.  Mild right pelvocaliectasis.  3.  No other acute findings in the abdomen.     Result    Narrative & Impression   EXAM: CT ABDOMEN PELVIS W CONTRAST  LOCATION: [facility]  DATE: 10/28/2024   INDICATION: Left-sided flank LLQ pain.  COMPARISON: CT 9/26/2024  TECHNIQUE: CT scan of the abdomen and pelvis was performed following injection of IV contrast. Multiplanar reformats were obtained. Dose reduction techniques were used.  CONTRAST: [id number] 80ml   FINDINGS:   LOWER CHEST: Normal.   HEPATOBILIARY: Normal.   PANCREAS: Normal.    SPLEEN: Normal.   ADRENAL GLANDS: Normal.   KIDNEYS/BLADDER: Subcentimeter cortical cyst right kidney requires no additional follow-up. The left kidney is unremarkable. No evidence for hydronephrosis. Thickening of the right anterior bladder wall is similar to the prior examination. This may be postoperative in nature, however this should be correlated with urinalysis.   BOWEL: No evidence for bowel obstruction or inflammatory changes.   LYMPH NODES: Normal.   VASCULATURE: Normal.   PELVIC ORGANS: Normal.   MUSCULOSKELETAL: Normal.                                                                 IMPRESSION:   1.  There is no evidence for hydronephrosis. Normal symmetric enhancement of the kidneys.  2.  Thickening of the right anterior aspect of the urinary bladder is similar to the prior exam. This may be postoperative in nature, however this should be correlated with patient's clinical history and consider urinalysis.  3.  No evidence for bowel obstruction or inflammation.        Impression:   Taina Castro is a 58 year old female with a 1 week history of  body aches, chills,sweats, insomnia, malaise, dry heaving and lower abdominal pain for which she was using tylenol and ibuprofen who present 3 days ago with normal CMP, CT imaging but also  noted leukopenia and thrombocytopenia. The symptoms persisted and she returned noting ongoing thrombocytopenia and significant elevation in LFTs, normal INR and US. She was started on NAC for concerns of tylenol toxicity with guidance from poison control. This has been stopped now since LFT have improved. Tylenol levels have been normal. Symptoms are slowly improving. Blood cultures and tick born lab work is pending. Thrombocytopenia has persisted and WBC is now normal. No know history of liver disorder or alcohol use.     Abnormal LFT could be tick born with thrombocytopenia, consider EBV, CMV, autoimmune, viral illness. Acute hepatitis panel is negative. No signs of  biliary obstruction on imaging. Acute onset less likely hereditary but will rule out with. Will check EBV, CMV, Autoimmune. She is improving. Will continue to monitor LFT's. If elevations persist and serological work up is inconclusive the would consider liver biopsy - will monitor platelets.   2. Thrombocytopenia- likely due to infection. No signs of liver disease. Will continue to monitor.     Plan:   Check serologies CMV, EBV, ceruloplasmin, alpha 1 antitrypsin, celiac panel, autoimmune markers, iron studies  Follow LFT, INR and platelets. Watch for signs of hepatic encephalopathy - none noted today.   Diet as tolerated   Await blood cultures and tick born serological work up  Continue supportive care.     Dr. Munson will review and offer further recommendations if indicated.   60 minutes of total time was spent providing patient care, including patient evaluation, reviewing documentation/test result, and .  ________________________________________________________________________      Skye Bowers [facility]   Office number     GI Staff Addendum  DOS 11/3/2024     Pt seen and discussed with Luanne OROSCO. Agree with evaluation, assessment and plan as outlined.    The patient is a 58-year-old female who presents to the hospital with diffuse body aches, pain, malaise, diaphoresis, chills.  Patient reports symptoms started approximately 1 week ago.  She was also complaining of left-sided abdominal pain without radiation.  Evaluation unremarkable.  Normal liver enzymes at that time.  Associated nausea however no change in bowel movements, no diarrhea or constipation.  The patient was taking alternating Tylenol and ibuprofen however no more than 3000 mg of acetaminophen per 24 hours.  Symptoms worsened and the patient returned.  Noted to have elevated liver enzymes , , alk phos 291, bilirubin 0.7.  Acetaminophen level less than 5.  Patient started on  "N-acetylcysteine.  INR normal.  Patient denies any other new medications.  No sick contacts.  Denies any herbal supplements or home remedies.  Traveled to [place] approximately 1 week prior to symptom onset.  Denies alcohol consumption.  No history of jaundice illness.  Reports overall improvement in symptoms since admission    /73 (BP Location: Right arm)   Pulse 91   Temp 98.3  F (36.8  C) (Oral)   Resp 18   Ht 1.702 m (5' 7\")   Wt 60.5 kg (133 lb 4.8 oz)   LMP 07/20/2011   SpO2 96%   BMI 20.88 kg/m    General: A&O, NAD, non-toxic appearing  Eyes: No icterus or conjunctivitis  Gastrointestinal: Soft, NTTP, no r/g    Liver Function Studies -   Recent Labs   Lab Test 11/03/24  0620   PROTTOTAL 6.0*   ALBUMIN 3.2*   BILITOTAL 0.5   ALKPHOS 230*   *   *     Acute hepatitis panel negative    Mononucleosis negative  CK level 35    US  1.  Trace fluid noted adjacent to the gallbladder, gallbladder otherwise unremarkable.  2.  Mild right pelvocaliectasis.  3.  No other acute findings in the abdomen.      Assessment/Plan:  Elevated liver enzymes.  Likely acute infectious illness.  Liver enzymes already improving.  Acute hepatitis panel negative.  Monoscreen negative.  No clear medication or toxin exposure.  Patient reports taking propria doses of Tylenol prior to presentation.  No cholelithiasis noted.  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  Given constitutional symptoms and general malaise, acute viral etiology seems most likely  Thrombocytopenia    -Diet as tolerated  -Follow-up pending labs  -Repeat LFTs in a.m.  -If continuing to improve clinically and liver enzymes downtrending, anticipate discharge with supportive measures and repeat LFTs in the near future.    20 minutes of total time was spent providing patient care including patient evaluation, reviewing documentation/test results, and .                                                  Yo Munson MD  Thank you " for the opportunity to participate in the care of this patient.   Please feel free to call me with any questions or concerns.  Phone number [phone].

## 2024-11-03 NOTE — PROGRESS NOTES
[facility] MEDICINE PROGRESS NOTE      Identification/Summary: Taina Castro is a 58 year old female with a past medical history of  depression, seen at emergency department on 10/28 with generalized body ache, left lower quadrant abdominal pain, nausea, abdomen CT scan was unremarkable at that time.  Was discharged home with Tylenol 1000 mg every 6 hours as needed and ibuprofen 600 mg every 6 hours as needed, came with persistent abdominal pain. Found to have elevated LFTs with -->451., -->631, possibly secondary to Tylenol toxicity.  LFTs 10/28.  Poison control is notified.  Started on N-acetylcysteine.  Admits drinking alcohol mainly red wine 1 to 2 glasses every day.  Liver ultrasound is negative.  Acute hepatitis panels are negative.  GI consultation requested.  Patient is feeling much better after IV hydration.    Assessment and Plan:  Acute LFTs  Possible Tylenol toxicity  LFTs are unremarkable on 10/28  -->451., -->631  Started on N-acetylcysteine on 8/2  Acute hepatitis panels are negative  Abdominal ultrasound is unremarkable  History of moderate alcohol use  GI consultation requested    Thrombocytopenia at 71    Hypokalemia, potassium replacement per protocol    Generalized body ache, improved  Low grade fever, resolved  Lower abdominal pain  Back pain  Oxycodone 40 mg every 6 hours as needed  Flexeril 10 mg 3 times a day as needed     Code. Full  DVT prophylaxis  Early ambulation and SCDs    Medically Ready for Discharge: Anticipated Tomorrow.  Monitor liver function        Clinically Significant Risk Factors Present on Admission        # Hypokalemia: Lowest K = 3 mmol/L in last 2 days, will replace as needed    # Hypocalcemia: Lowest Ca = 8.1 mg/dL in last 2 days, will monitor and replace as appropriate     # Hypoalbuminemia: Lowest albumin = 3.2 g/dL at 11/3/2024  6:20 AM, will monitor as appropriate   # Thrombocytopenia: Lowest platelets = 71  in last 2 days, will monitor for bleeding                        Tigist Pineda MD  [facility]  Phone: #[phone]  Securely message with the Vocera Web Console (learn more here)  Text page via [employer] Paging/Directory     Interval History/Subjective:  Resting comfortably.  Generalized body ache improves.  Abdominal pain and back pain are slowly improving.  Appetite is stable.  No further nausea or dry heaves.  Afebrile.  Feeling much better today.    Physical Exam/Objective:  Temp:  [97.4  F (36.3  C)-98.6  F (37  C)] 98.6  F (37  C)  Pulse:  [] 85  Resp:  [16-18] 16  BP: (110-131)/(60-84) 130/60  SpO2:  [92 %-99 %] 96 %  Body mass index is 20.67 kg/m .    GENERAL:  Alert, appears comfortable, in no acute distress, appears stated age   HEAD:  Normocephalic, without obvious abnormality, atraumatic   EYES:  PERRL, conjunctiva clear,  EOM's intact   NOSE: Nares normal,  mucosa normal, no drainage   THROAT: Lips, mucosa,  gums normal, mouth moist   NECK: Supple, symmetrical, trachea midline   BACK:   Symmetric, no curvature, ROM normal   LUNGS:   Clear to auscultation bilaterally, no rales, rhonchi, or wheezing, symmetric chest rise on inhalation, respirations unlabored   CHEST WALL:  No tenderness or deformity   HEART:  Regular rate and rhythm, S1 and S2 normal, no murmur    ABDOMEN:   Soft, mild diffuse tenderness, bowel sounds active , no masses, no organomegaly, no rebound or guarding   EXTREMITIES: No BLE edema    SKIN: No rashes in the visualized areas   NEURO: Alert, oriented x 4, moves all four extremities freely, non-focal   PSYCH: Cooperative, behavior is appropriate         Data reviewed today: I personally reviewed all new medications, labs, imaging/diagnostics reports over the past 24 hours. Pertinent findings include:    Imaging:   Abdomen us  1.  Trace fluid noted adjacent to the gallbladder, gallbladder otherwise unremarkable.  2.  Mild  right pelvocaliectasis.  3.  No other acute findings in the abdomen.     Abdomen ct  1.  There is no evidence for hydronephrosis. Normal symmetric enhancement of the kidneys.  2.  Thickening of the right anterior aspect of the urinary bladder is similar to the prior exam. This may be postoperative in nature, however this should be correlated with patient's clinical history and consider urinalysis.  3.  No evidence for bowel obstruction or inflammation.    Labs:  Most Recent 3 CBC's:  Recent Labs   Lab Test 11/02/24  1021 10/28/24  2017   WBC 8.2 3.6*   HGB 13.9 15.3   MCV 93 95   PLT 71* 123*     Most Recent 3 BMP's:  Recent Labs   Lab Test 11/03/24  0620 11/02/24  1021 10/28/24  2017    139 139   POTASSIUM 3.0* 3.6 4.3   CHLORIDE 101 100 100   CO2 25 26 26   BUN 6.3 9.0 9.9   CR 0.60 0.68 0.79   ANIONGAP 12 13 13   TEENA 8.1* 8.7* 9.0   * 102* 120*     Most Recent 2 LFT's:  Recent Labs   Lab Test 11/03/24  0620 11/02/24  1021   * 728*   * 791*   ALKPHOS 230* 291*   BILITOTAL 0.5 0.7     Most Recent 3 INR's:  Recent Labs   Lab Test 11/03/24  0620 11/02/24  1425   INR 1.13 1.02       Medications:   Personally Reviewed.  Medications   Current Facility-Administered Medications   Medication Dose Route Frequency Provider Last Rate Last Admin    acetylcysteine (ACETADOTE) 6,000 mg in D5W STEP TWO infusion  100 mg/kg Intravenous Continuous Zain Horan MD 64.4 mL/hr at 11/02/24 1918 6,000 mg at 11/02/24 1918    sodium chloride 0.9 % infusion   Intravenous Continuous Tigist Pineda  mL/hr at 11/03/24 0332 New Bag at 11/03/24 0332     Current Facility-Administered Medications   Medication Dose Route Frequency Provider Last Rate Last Admin    desvenlafaxine succinate (PRISTIQ) 24 hr tablet 25 mg  25 mg Oral Daily Tigist Pineda MD        lactobacillus rhamnosus (GG) (CULTURELL) capsule 1 capsule  1 capsule Oral Daily Tigist Pineda MD   1 capsule at 11/02/24 1748    magnesium oxide  (MAG-OX) tablet 400 mg  400 mg Oral Q4H Tigist Pineda MD        pantoprazole (PROTONIX) EC tablet 40 mg  40 mg Oral BID AC Tigist Pineda MD   40 mg at 11/03/24 0633    potassium chloride alcira ER (KLOR-CON M20) CR tablet 20 mEq  20 mEq Oral Once Tigist Pineda MD        potassium chloride alcira ER (KLOR-CON M20) CR tablet 40 mEq  40 mEq Oral Once Tigist Pineda MD        sodium chloride (PF) 0.9% PF flush 3 mL  3 mL Intracatheter Q8H Tigist Pineda MD            Total time spent 50 min

## 2024-11-03 NOTE — PLAN OF CARE
"  Problem: Pain Acute  Goal: Optimal Pain Control and Function  Outcome: Progressing  Intervention: Prevent or Manage Pain  Recent Flowsheet Documentation  Taken 11/3/2024 1100 by Heather Hernandes RN  Medication Review/Management: medications reviewed     Problem: Adult Inpatient Plan of Care  Goal: Optimal Comfort and Wellbeing  Outcome: Progressing   Goal Outcome Evaluation:       PRIMARY DIAGNOSIS: \"GENERIC\" NURSING  OUTPATIENT/OBSERVATION GOALS TO BE MET BEFORE DISCHARGE:  ADLs back to baseline: Yes    Activity and level of assistance: Ambulating independently.    Pain status: Improved-controlled with oral pain medications.    Return to near baseline physical activity: Yes     Discharge Planner Nurse   Safe discharge environment identified: Yes  Barriers to discharge: No       Entered by: Heather Hernandes RN 11/03/2024 1:29 PM     Please review provider order for any additional goals.   Nurse to notify provider when observation goals have been met and patient is ready for discharge.      Patient alert and oriented times 4. IND in room. Tolerating oral intake. PRN oxycodone, benadryl, and flexeril used and effective. NS at 75mL. Patient likes to take several shower because the hot water hitting her back makes the pain better. Blood cultures pending. Call light and phone within reach. Family is bedside.             "

## 2024-11-03 NOTE — PLAN OF CARE
Problem: Adult Inpatient Plan of Care  Goal: Optimal Comfort and Wellbeing  Outcome: Progressing     Pt A&Ox4. VSS on RA. Pain to back, no PRN medication available, on call MD paged and order received for IV toradol. Voiding adequately. Up with independently in room. NS at 100 ml/hr infusing, acetylcysteine infusing at 64.4 ml/hr. Poison control will call in AM to review labs. Consults pending.

## 2024-11-04 ENCOUNTER — APPOINTMENT (OUTPATIENT)
Dept: RADIOLOGY | Facility: CLINIC | Age: 59
DRG: 866 | End: 2024-11-04
Attending: INTERNAL MEDICINE
Payer: COMMERCIAL

## 2024-11-04 LAB
A1AT SERPL-MCNC: 201 MG/DL (ref 90–200)
ALBUMIN SERPL BCG-MCNC: 3.2 G/DL (ref 3.5–5.2)
ALP SERPL-CCNC: 252 U/L (ref 40–150)
ALT SERPL W P-5'-P-CCNC: 926 U/L (ref 0–50)
ANION GAP SERPL CALCULATED.3IONS-SCNC: 9 MMOL/L (ref 7–15)
AST SERPL W P-5'-P-CCNC: 841 U/L (ref 0–45)
BILIRUB DIRECT SERPL-MCNC: 0.45 MG/DL (ref 0–0.3)
BILIRUB SERPL-MCNC: 0.7 MG/DL
BUN SERPL-MCNC: 4.2 MG/DL (ref 6–20)
CALCIUM SERPL-MCNC: 8.2 MG/DL (ref 8.8–10.4)
CERULOPLASMIN SERPL-MCNC: 41 MG/DL (ref 20–60)
CHLORIDE SERPL-SCNC: 101 MMOL/L (ref 98–107)
CK SERPL-CCNC: 28 U/L (ref 26–192)
CMV DNA SPEC NAA+PROBE-ACNC: NOT DETECTED IU/ML
CREAT SERPL-MCNC: 0.56 MG/DL (ref 0.51–0.95)
CRP SERPL-MCNC: 35.4 MG/L
EBV DNA SERPL NAA+PROBE-ACNC: 2130 IU/ML
EBV DNA SERPL NAA+PROBE-LOG#: 3.3 {LOG_COPIES}/ML
EGFRCR SERPLBLD CKD-EPI 2021: >90 ML/MIN/1.73M2
ERYTHROCYTE [DISTWIDTH] IN BLOOD BY AUTOMATED COUNT: 13.8 % (ref 10–15)
ERYTHROCYTE [SEDIMENTATION RATE] IN BLOOD BY WESTERGREN METHOD: 12 MM/HR (ref 0–30)
GLUCOSE SERPL-MCNC: 101 MG/DL (ref 70–99)
HCO3 SERPL-SCNC: 27 MMOL/L (ref 22–29)
HCT VFR BLD AUTO: 36.7 % (ref 35–47)
HGB BLD-MCNC: 12.2 G/DL (ref 11.7–15.7)
HOLD SPECIMEN: NORMAL
INR PPP: 1.01 (ref 0.85–1.15)
MAGNESIUM SERPL-MCNC: 1.9 MG/DL (ref 1.7–2.3)
MCH RBC QN AUTO: 31.4 PG (ref 26.5–33)
MCHC RBC AUTO-ENTMCNC: 33.2 G/DL (ref 31.5–36.5)
MCV RBC AUTO: 95 FL (ref 78–100)
PATH REPORT.COMMENTS IMP SPEC: NORMAL
PATH REPORT.FINAL DX SPEC: NORMAL
PATH REPORT.MICROSCOPIC SPEC OTHER STN: NORMAL
PATH REPORT.RELEVANT HX SPEC: NORMAL
PHOSPHATE SERPL-MCNC: 2.3 MG/DL (ref 2.5–4.5)
PLATELET # BLD AUTO: 99 10E3/UL (ref 150–450)
POTASSIUM SERPL-SCNC: 3.5 MMOL/L (ref 3.4–5.3)
PROT SERPL-MCNC: 6.1 G/DL (ref 6.4–8.3)
RBC # BLD AUTO: 3.88 10E6/UL (ref 3.8–5.2)
SODIUM SERPL-SCNC: 137 MMOL/L (ref 135–145)
WBC # BLD AUTO: 8.1 10E3/UL (ref 4–11)

## 2024-11-04 PROCEDURE — 82550 ASSAY OF CK (CPK): CPT | Performed by: INTERNAL MEDICINE

## 2024-11-04 PROCEDURE — 86140 C-REACTIVE PROTEIN: CPT | Performed by: INTERNAL MEDICINE

## 2024-11-04 PROCEDURE — 82247 BILIRUBIN TOTAL: CPT | Performed by: EMERGENCY MEDICINE

## 2024-11-04 PROCEDURE — 250N000013 HC RX MED GY IP 250 OP 250 PS 637: Performed by: FAMILY MEDICINE

## 2024-11-04 PROCEDURE — 99233 SBSQ HOSP IP/OBS HIGH 50: CPT | Performed by: FAMILY MEDICINE

## 2024-11-04 PROCEDURE — 258N000003 HC RX IP 258 OP 636: Performed by: FAMILY MEDICINE

## 2024-11-04 PROCEDURE — 84100 ASSAY OF PHOSPHORUS: CPT | Performed by: FAMILY MEDICINE

## 2024-11-04 PROCEDURE — 72100 X-RAY EXAM L-S SPINE 2/3 VWS: CPT

## 2024-11-04 PROCEDURE — 85610 PROTHROMBIN TIME: CPT | Performed by: EMERGENCY MEDICINE

## 2024-11-04 PROCEDURE — 82435 ASSAY OF BLOOD CHLORIDE: CPT | Performed by: EMERGENCY MEDICINE

## 2024-11-04 PROCEDURE — 120N000001 HC R&B MED SURG/OB

## 2024-11-04 PROCEDURE — 36415 COLL VENOUS BLD VENIPUNCTURE: CPT | Performed by: EMERGENCY MEDICINE

## 2024-11-04 PROCEDURE — 250N000013 HC RX MED GY IP 250 OP 250 PS 637: Performed by: INTERNAL MEDICINE

## 2024-11-04 PROCEDURE — 82248 BILIRUBIN DIRECT: CPT | Performed by: NURSE PRACTITIONER

## 2024-11-04 PROCEDURE — 99222 1ST HOSP IP/OBS MODERATE 55: CPT | Performed by: INTERNAL MEDICINE

## 2024-11-04 PROCEDURE — 85652 RBC SED RATE AUTOMATED: CPT | Performed by: INTERNAL MEDICINE

## 2024-11-04 PROCEDURE — 85027 COMPLETE CBC AUTOMATED: CPT | Performed by: FAMILY MEDICINE

## 2024-11-04 PROCEDURE — 83735 ASSAY OF MAGNESIUM: CPT | Performed by: FAMILY MEDICINE

## 2024-11-04 RX ORDER — OXYCODONE HYDROCHLORIDE 5 MG/1
5 TABLET ORAL ONCE
Status: COMPLETED | OUTPATIENT
Start: 2024-11-04 | End: 2024-11-04

## 2024-11-04 RX ORDER — POTASSIUM CHLORIDE 1500 MG/1
20 TABLET, EXTENDED RELEASE ORAL ONCE
Status: COMPLETED | OUTPATIENT
Start: 2024-11-04 | End: 2024-11-04

## 2024-11-04 RX ORDER — MAGNESIUM OXIDE 400 MG/1
400 TABLET ORAL EVERY 4 HOURS
Status: COMPLETED | OUTPATIENT
Start: 2024-11-04 | End: 2024-11-04

## 2024-11-04 RX ADMIN — PANTOPRAZOLE SODIUM 40 MG: 40 TABLET, DELAYED RELEASE ORAL at 07:55

## 2024-11-04 RX ADMIN — Medication 400 MG: at 09:05

## 2024-11-04 RX ADMIN — CYCLOBENZAPRINE 10 MG: 10 TABLET, FILM COATED ORAL at 20:00

## 2024-11-04 RX ADMIN — IBUPROFEN 400 MG: 400 TABLET, FILM COATED ORAL at 01:08

## 2024-11-04 RX ADMIN — Medication 400 MG: at 13:54

## 2024-11-04 RX ADMIN — OXYCODONE 5 MG: 5 TABLET ORAL at 02:00

## 2024-11-04 RX ADMIN — IBUPROFEN 400 MG: 400 TABLET, FILM COATED ORAL at 13:54

## 2024-11-04 RX ADMIN — OXYCODONE 5 MG: 5 TABLET ORAL at 04:53

## 2024-11-04 RX ADMIN — POTASSIUM CHLORIDE 20 MEQ: 1500 TABLET, EXTENDED RELEASE ORAL at 09:05

## 2024-11-04 RX ADMIN — PANTOPRAZOLE SODIUM 40 MG: 40 TABLET, DELAYED RELEASE ORAL at 16:51

## 2024-11-04 RX ADMIN — IBUPROFEN 400 MG: 400 TABLET, FILM COATED ORAL at 07:54

## 2024-11-04 RX ADMIN — SODIUM CHLORIDE: 9 INJECTION, SOLUTION INTRAVENOUS at 02:01

## 2024-11-04 RX ADMIN — CYCLOBENZAPRINE 10 MG: 10 TABLET, FILM COATED ORAL at 01:12

## 2024-11-04 RX ADMIN — CYCLOBENZAPRINE 10 MG: 10 TABLET, FILM COATED ORAL at 07:54

## 2024-11-04 RX ADMIN — LIDOCAINE 1 PATCH: 4 PATCH TOPICAL at 13:54

## 2024-11-04 RX ADMIN — OXYCODONE 5 MG: 5 TABLET ORAL at 20:00

## 2024-11-04 RX ADMIN — Medication 1 CAPSULE: at 08:29

## 2024-11-04 NOTE — PLAN OF CARE
Problem: Adult Inpatient Plan of Care  Goal: Optimal Comfort and Wellbeing  Outcome: Progressing     Problem: Pain Acute  Goal: Optimal Pain Control and Function  Outcome: Progressing  Intervention: Prevent or Manage Pain  Recent Flowsheet Documentation  Taken 11/4/2024 0830 by Heather Hernandes RN  Medication Review/Management: medications reviewed   Goal Outcome Evaluation:       Patient alert and oriented times 4. Tolerating oral intake. Independent in room. Pain managed with Flexeril, ibuprofen, and lidocaine patch. Saline locked. Elevated ALT and AFT. Mag, K, and Phos protocol. Call light and phone within reach.

## 2024-11-04 NOTE — PLAN OF CARE
Problem: Pain Acute  Goal: Optimal Pain Control and Function  Outcome: Progressing  Intervention: Prevent or Manage Pain  Recent Flowsheet Documentation  Taken 11/4/2024 1541 by Zohra Ramos RN  Bowel Elimination Promotion: ambulation promoted  Medication Review/Management: medications reviewed   Goal Outcome Evaluation:    Pt alert and oriented x 4. VSS. Lung sounds clear diminished. Denies SOB, chest pain and N/V. Tolerating regular diet. Voiding spontaneously. Up independently in room. Calls appropriately.

## 2024-11-04 NOTE — PLAN OF CARE
Problem: Adult Inpatient Plan of Care  Goal: Optimal Comfort and Wellbeing  Outcome: Progressing       Pt A&Ox4. VSS on RA. Voiding adequately. Up independently in room. Pain not well managed overnight, remains 7/10 after PRN oxycodone, flexeril and ibuprofen. On call Dr. Coto notified and order received for additional 5 mg tab, pt able to sleep after dose. X ray and additional labs ordered for AM. Magnesium, potassium and phosphorus protocol. IV SL. ID consult pending.

## 2024-11-04 NOTE — PROGRESS NOTES
Cross cover -- Back pain    Seen bedside. Pain on lower back almost in the spine to the right side. Non radiating. Also has pain on thighs and legs. She has pain that sounds generalized, that started a week ago - hence, she was taking Ibuprofen and tylenol    Despite muscle pains - total CK levels were not elevated.     She has low platelet count, high AST/ALT  -- however - PCRs of the Anaplasma, Babesia and Ehrlichia -- all negative     Plans  - was given extra oxycodone  - Check CK, ESR,CRP  - Check lumbar spine XR   - Will stop IVF - no indication     - Will consult ID -- need inputs/recommendations  ----- recent trip to [place] prior to condition - and now presented with very low platelets, low WBC (10/28), elevated AST/ALT, and generalized body aches -- Pretty healthy before all this.  Tick born PCR was done 11/2 - but having symptoms even before 10/28 ED visit. ??Is this tick-born infection or some sort of viral infection. Not sure if negative Tick PCR test sufficient to rule out Tick born. She still not feeling back to her baseline.  Atypical Lymphocytes seen on 11/2, Flow Cytometry ordered by ED. Mononucleosis test is negative    7A - Discussed with AM doc to follow up

## 2024-11-04 NOTE — CONSULTS
INFECTIOUS DISEASE CONSULT NOTE    Date: 11/04/2024   CHIEF COMPLAINT:   Chief Complaint   Patient presents with    Generalized Body Aches        ================================================================  SUBJECTIVE    HPI  Ms. Castro is a 59yo female who presents with one week of symptoms. PMH MDD.    She developed new fevers, chills, malaise one week prior to admission. A few days later began to have deep LUQ abdominal pain radiating to her back.  Also noted facial swelling for a few days during her illness.  She had initial evaluation in the ED on 10/28 including negative CT scan and RVP test. She went home and took tylenol and ibuprofen which took the edge off the pain but didn't help that much.  Due to ongoing symptoms returned to the ED on 11/2.    Social history notes: went to [place] to [place] from 10/12 - 10/20 with her . Her  did not get sick.  She did not have any exotic food exposures or water-borne exposures. Sexually active only with , no new sexual partners, low concern for STI. No smoking or IVDU. Works in pres-school setting.    Today she is globally unchanged from admission.    Past Medical History  Active Ambulatory Problems     Diagnosis Date Noted    Postoperative state 07/21/2021     Resolved Ambulatory Problems     Diagnosis Date Noted    No Resolved Ambulatory Problems     Past Medical History:   Diagnosis Date    Motion sickness        Past Surgical History  She   has a past surgical history that includes Laparoscopic hysterectomy supracervical (Bilateral, 7/21/2021) and Colporrhapy anterior, cystoscopy, combined (N/A, 7/21/2021).    Social History  she   reports that she has never smoked. She has never used smokeless tobacco. She reports current alcohol use. She reports that she does not use drugs.    Family History  Reviewed and non-contributory  family history includes Breast Cancer in her sister.    Social Needs  Social History     Social History  "Narrative    Not on file       ================================================================  OBJECTIVE  /77 (BP Location: Right arm)   Pulse 97   Temp 97.7  F (36.5  C) (Oral)   Resp 17   Ht 1.702 m (5' 7\")   Wt 61.2 kg (135 lb)   LMP 07/20/2011   SpO2 94%   BMI 21.14 kg/m      Physical Exam  General: alert, cooperative, no apparent distress  HEENT: atraumatic, normocephalic, no scleral icterus, moist mucus membranes, no cervical lymphadenopathy  Heart: Normal rate, regular rhythm, no murmurs  Lungs: CTAB, good inspiratory effort  Abdomen: soft, non-tender, non-distended  Extremities: no peripheral edema, no new rashes or lesions    Pertinent labs  CBC RESULTS:   Recent Labs   Lab Test 11/04/24  0704   WBC 8.1   RBC 3.88   HGB 12.2   HCT 36.7   MCV 95   MCH 31.4   MCHC 33.2   RDW 13.8   PLT 99*        Imaging  10/28 CT A/P  1.  There is no evidence for hydronephrosis. Normal symmetric enhancement of the kidneys.  2.  Thickening of the right anterior aspect of the urinary bladder is similar to the prior exam. This may be postoperative in nature, however this should be correlated with patient's clinical history and consider urinalysis.  3.  No evidence for bowel obstruction or inflammation    11/2 US  1.  Trace fluid noted adjacent to the gallbladder, gallbladder otherwise unremarkable.  2.  Mild right pelvocaliectasis.  3.  No other acute findings in the abdomen    Microbiology data  10/28 RVP: negative  11/2 Hep A/B/C: negative  11/2 tick-borne panel: negative  11/2 blood culture: NGTD  11/2 monospot: negative  11/3 CMV PCR: pending  11/3 EBV PCR: pending      I have personally reviewed the relevant laboratory, imaging, and microbiology data  ================================================================  Assessment  Taina M Gloria is a 58 year old with one week of systemic symptoms (fever, chills, abdominal pain) + transaminase elevation, leukopenia, thrombocytopenia, and " inflammation.    Diagnosis is unclear at this time. From infection standpoint, could be consistent with either viral illness (EBV, CMV, other herpesviridae, HIV) or tickborne illness (ehrlichia/anaplasma/lyme are all negative). Viral illness does seem plausible, though would not be a typical presentation of acute EBV/CMV given lack of URI symptoms.    GI is following and pursuing other avenues including assessment for autoimmune hepatitis.      Impression  # Systemic symptoms (fever, chills, abdominal pain)  # Transaminase elevation  # Leukopenia  # thrombocytopenia    ================================================================  Plan  - Await EBV/CMV PCR testing  - Trend blood cultures  - Follow GI workup  - ID will follow    Arik Downing MD, MPH  [facility]   Office Telephone [phone].  Fax [phone]  [facility] paging

## 2024-11-04 NOTE — PROGRESS NOTES
[facility]    I agree with the documentation and findings as written by Mariann Wetzel  and our discussed and formulated assessment and plan. I was present with Mariann Wetzel  who participated in the service and documentation of the note. I have also personally verified the history and personally performed the physical exam and medical decision-making. I agree with the assessment and plan of care as documented in the note.    Tigist Pineda MD  Internal Medicine  Hospitalist  [facility]  Phone: #[phone]     Medicine Progress Note - Hospitalist Service    Date of Admission:  11/2/2024    Assessment & Plan   Taina Castro is a 58 year old female with a PMHx of depression and prior hysterectomy and appendectomy who was admitted on 11/2 due to elevated liver enzymes around the 700s. Patient was recently seen in the ER (10/28) due to left sided abdominal pain with chills and generalized malaise. Abdominal CT scan was unremarkable at that time. Patient was sent home with Tylenol 1000 mg every 6 hours as needed and ibuprofen 600 mg every 6 hours as needed. On day of admission patient presented with persistent abdominal pain, chills, body aches, and sweats. Elevated LFTs with -->451-->841,  -->631-->926 .Possible Tylenol toxicity. Poison control was notified, treated with N-acetylcysteine. Patient endorsed drinking 1/2 glasses of red wine every day.  Screen is negative.  No clear medication or toxin exposure.  No cholelithiasis on ultrasound.  Possible autoimmune hepatitis.  Has generalized body ache, low-grade fever and thrombocytopenia.  Evaluated by gastroenterology.  ID consultation requested.      Acute LFTs  Possible Tylenol toxicity  History of moderate alcohol use  Generalized body aches   Lymphocytosis  - -->451-->841,  -->631-->926   - Status post N-acetylcysteine for possible Tylenol toxicity  - Acute hepatitis panels are  negative  - Abdominal ultrasound is unremarkable  - Tick-Borne Disease Panel: negative  - Pending: EBV, KRYSTLE, AMA, SMA (F actin), CMV, celiac   - Elevated Ferritin likely acute phase reaction  - GI following, appreciate recommendations  - ID consult is pending      Thrombocytopenia   - On admission (71), most recent (99)  - Continue to monitor      Back pain  - Continue Oxycodone 40 mg every 6 hours as needed  - Continue Flexeril 10 mg 3 times a day as needed   - XR of spine findings: Transitional spinal anatomy with partial lumbarization of the S1 vertebral body. Vertebral body heights are maintained. There is trace retrolisthesis at L5-S1. Minimal levocurvature of the thoracolumbar spine with left apex at L1-L2.           Diet: Regular Diet Adult    DVT Prophylaxis: Pneumatic Compression Devices and Ambulate every shift  Kang Catheter: Not present  Lines: None     Cardiac Monitoring: None  Code Status: Full Code      Clinically Significant Risk Factors        # Hypokalemia: Lowest K = 3 mmol/L in last 2 days, will replace as needed    # Hypocalcemia: Lowest Ca = 8.1 mg/dL in last 2 days, will monitor and replace as appropriate     # Hypoalbuminemia: Lowest albumin = 3.2 g/dL at 11/4/2024  7:04 AM, will monitor as appropriate   # Thrombocytopenia: Lowest platelets = 84 in last 2 days, will monitor for bleeding                        Medically Ready for Discharge: Anticipated in 2-4 Days.  Awaiting for LFTs to improve           The patient's care was discussed with the Attending Physician, Dr. Pineda .    YAIR BrewerS    Dr. Tigist Pineda   Hospitalist Service  [facility]  Securely message with [employer] (more info)  Text page via [employer] Paging/Directory   ______________________________________________________________________    Interval History   Pertinent overnight events: Patient developed non radiating lower back pain to the right side with pain on thighs and legs. Patient was given  extra oxycodone and spine XR was ordered    Today patient's vital sign are stable, she is A&Ox3, and appears in no acute distress. Patient was mildly shaky, slightly tachycardic on exam, and diaphoretic. Denies any chest pain, SOB, numbness, tingling, weakness, or headaches. States she has 3/10 lower back pain that has significantly improved after last night. She is still endorsing generalized pain in the thighs. Shares her sister has alopecia and a thyroid disorder. No other known autoimmune diseases.     Physical Exam   Vital Signs: Temp: 97.7  F (36.5  C) Temp src: Oral BP: 114/77 Pulse: 97   Resp: 17 SpO2: 94 % O2 Device: None (Room air)    Weight: 135 lbs 0 oz    Physical Exam  Vitals and nursing note reviewed.   Constitutional:       General: She is not in acute distress.     Appearance: She is diaphoretic. She is not toxic-appearing.   HENT:      Head: Normocephalic and atraumatic.   Eyes:      Extraocular Movements: Extraocular movements intact.      Pupils: Pupils are equal, round, and reactive to light.   Cardiovascular:      Rate and Rhythm: Regular rhythm. Tachycardia present.      Pulses: Normal pulses.      Heart sounds: Normal heart sounds. No murmur heard.     No gallop.   Pulmonary:      Effort: Pulmonary effort is normal. No respiratory distress.      Breath sounds: Normal breath sounds. No stridor. No wheezing or rales.   Abdominal:      General: Abdomen is flat. Bowel sounds are normal. There is no distension.      Palpations: Abdomen is soft.      Tenderness: There is no abdominal tenderness.   Musculoskeletal:         General: No swelling or tenderness.      Cervical back: Normal range of motion.      Right lower leg: No edema.      Left lower leg: No edema.   Skin:     General: Skin is warm.      Comments: Clammy   Neurological:      General: No focal deficit present.      Mental Status: She is alert and oriented to person, place, and time.      Cranial Nerves: No cranial nerve deficit.       Sensory: No sensory deficit.      Motor: No weakness.   Psychiatric:         Mood and Affect: Mood normal.           Data     I have personally reviewed the following data over the past 24 hrs:    8.1  \   12.2   / 99 (L)     137 101 4.2 (L) /  101 (H)   3.5 27 0.56 \     ALT: 926 (HH) AST: 841 (HH) AP: 252 (H) TBILI: 0.7   ALB: 3.2 (L) TOT PROTEIN: 6.1 (L) LIPASE: N/A     Procal: N/A CRP: 35.40 (H) Lactic Acid: N/A       INR:  1.01 PTT:  N/A   D-dimer:  N/A Fibrinogen:  N/A     Ferritin:  N/A % Retic:  N/A LDH:  N/A       Imaging results reviewed over the past 24 hrs:   Recent Results (from the past 24 hours)   XR Lumbar Spine 2/3 Views    Narrative    EXAM: XR LUMBAR SPINE 2/3 VIEWS  LOCATION: [facility]  DATE: 11/4/2024    INDICATION: back pain  COMPARISON: CT abdomen pelvis 10/28/2024.      Impression    IMPRESSION: Transitional spinal anatomy with partial lumbarization of the S1 vertebral body. Vertebral body heights are maintained. There is trace retrolisthesis at L5-S1. Minimal levocurvature of the thoracolumbar spine with left apex at L1-L2.   Intervertebral disc heights are maintained. There is no bulky facet or endplate arthropathy. Extra spinal structures are unremarkable.     Total time spent 50 min

## 2024-11-04 NOTE — PLAN OF CARE
Pt has back pain of 7/10, pain medications given (see MAR). Regular diet. Independent. Protocols ran, replacements completed per order. Alert and oriented.       Gisell Jules RN on 11/3/2024 at 10:55 PM

## 2024-11-04 NOTE — PROGRESS NOTES
GI PROGRESS NOTE  11/4/2024  Taina Castro  1965  /-49    Subjective:  She is feeling much better overall and denies having abdominal pain now.  She had significant malaise with chills and left sided abdominal pain previously.    She denies taking any oral iron, and she does not eat meat.  No known family history of hemachromatosis or liver disease.     Objective:    Patient Vitals for the past 24 hrs:   BP Temp Temp src Pulse Resp SpO2 Weight   11/04/24 0804 114/77 97.7  F (36.5  C) Oral 97 17 94 % --   11/04/24 0449 121/78 97.5  F (36.4  C) Oral 102 14 97 % 61.2 kg (135 lb)   11/03/24 2333 112/69 98.3  F (36.8  C) Oral 86 16 92 % --   11/03/24 1631 125/73 98.3  F (36.8  C) Oral 91 18 96 % --   11/03/24 1050 -- -- -- -- -- -- 60.5 kg (133 lb 4.8 oz)     Body mass index is 21.14 kg/m .  Gen: NAD  GI: Non-distended, BS positive, soft, non-tender    Laboratory  Recent Labs   Lab Test 11/04/24  0704 11/03/24  0620 11/02/24  1425 11/02/24  1021   WBC 8.1 10.2  --  8.2   HGB 12.2 12.4  --  13.9   MCV 95 93  --  93   PLT 99* 84*  --  71*   INR 1.01 1.13 1.02  --      Recent Labs   Lab Test 11/04/24  0704 11/03/24  1456 11/03/24  0620 11/02/24  1021     --  138 139   POTASSIUM 3.5 3.5 3.0* 3.6   CHLORIDE 101  --  101 100   CO2 27  --  25 26   BUN 4.2*  --  6.3 9.0   CR 0.56  --  0.60 0.68   ANIONGAP 9  --  12 13   TEENA 8.2*  --  8.1* 8.7*   *  --  125* 102*     Recent Labs   Lab Test 11/04/24  0704 11/03/24  0620 11/02/24  1021 10/28/24  2049   ALBUMIN 3.2* 3.2* 3.6  --    BILITOTAL 0.7 0.5 0.7  --    * 631* 791*  --    * 451* 728*  --    ALKPHOS 252* 230* 291*  --    PROTEIN  --   --   --  Negative     CRP 66.3 --> 35.4  CK low/normal  Alpha 1 antitrypsin 201 (not low)  Ceruloplasmin 41 (normal)  Iron 94 (normal),  (mildly low), iron sat 46% (high end of normal), ferritin 3504 (high)  Influenza negative  Mononucleosis negative  APAP < 5  Acute hepatitis panel negative,  all non reactive  Tick borne PCR- negative anaplasma, babesia, ehrlichia  11/2 blood culture no growth to date    Pending: EBV, KRYTSLE, AMA, SMA (F actin), CMV, celiac    XR Lumbar Spine 2/3 Views  Result Date: 11/4/2024  EXAM: XR LUMBAR SPINE 2/3 VIEWS LOCATION: [facility] DATE: 11/4/2024 INDICATION: back pain COMPARISON: CT abdomen pelvis 10/28/2024.     IMPRESSION: Transitional spinal anatomy with partial lumbarization of the S1 vertebral body. Vertebral body heights are maintained. There is trace retrolisthesis at L5-S1. Minimal levocurvature of the thoracolumbar spine with left apex at L1-L2. Intervertebral disc heights are maintained. There is no bulky facet or endplate arthropathy. Extra spinal structures are unremarkable.    US Abdomen Limited  Result Date: 11/2/2024  EXAM: US ABDOMEN LIMITED LOCATION: [facility] DATE: 11/2/2024 INDICATION: critical high AST, ALT COMPARISON: 10/28/2024 TECHNIQUE: Limited abdominal ultrasound. FINDINGS: GALLBLADDER: Trace fluid noted by the neck of the gallbladder. Gallbladder otherwise within normal limits. Sonographic Richardson sign is negative. Gallbladder wall within normal thickness. BILE DUCTS: No biliary dilatation. The common duct measures 5 mm. LIVER: Normal parenchyma with smooth contour. No focal mass. RIGHT KIDNEY: Mild right pelvocaliectasis. PANCREAS: The visualized portions are normal. No ascites.     IMPRESSION: 1.  Trace fluid noted adjacent to the gallbladder, gallbladder otherwise unremarkable. 2.  Mild right pelvocaliectasis. 3.  No other acute findings in the abdomen.     10/28/2024 CT abdomen/pelvis with contrast  IMPRESSION:   1.  There is no evidence for hydronephrosis. Normal symmetric enhancement of the kidneys.  2.  Thickening of the right anterior aspect of the urinary bladder is similar to the prior exam. This may be postoperative in nature, however this should be correlated with patient's clinical  history and consider urinalysis.  3.  No evidence for bowel obstruction or inflammation.    Assessment:  The patient is a 58-year-old female who presents to the hospital with diffuse body aches, pain, malaise, diaphoresis, chills.  Initially she had normal LFTs and negative CT and was discharged with appropriate doses of Tylenol.  Came back with worsened symptoms and elevated LFTs.  No known sick contacts or obvious tick bite, no new meds or herbals.  No excessive alcohol intake reported, and ultrasound is negative. ID has been consulted.    Elevated liver enzymes.  Likely acute infectious illness.  Liver enzymes already improving.  Acute hepatitis panel negative.  Monoscreen negative.  No clear medication or toxin exposure.  Patient reports taking appropriate doses of Tylenol prior to presentation.  No cholelithiasis noted.  Could be acute presentation of autoimmune hepatitis.  Given constitutional symptoms and general malaise, acute viral etiology seems most likely.  Ferritin is elevated but likely acute phase reactant.  Thrombocytopenia      Patient Active Problem List   Diagnosis    Postoperative state    Viral syndrome    Lymphocytosis    Elevated LFTs    Tylenol overdose, accidental or unintentional, initial encounter        Plan:    1. Await pending liver serologies.  2.  Supportive cares.  3.  ID consult is pending.  4.  GI following.  5.  Monitor labs.  6.  Check HFE gene test.    25 minutes of total time was spent providing patient care, including patient evaluation, reviewing documentation/test results and .                                                Bethany Brice PA-C  Thank you for the opportunity to participate in the care of this patient.   Please feel free to call me with any questions or concerns.  Phone number [phone].

## 2024-11-05 LAB
ALBUMIN SERPL BCG-MCNC: 3.2 G/DL (ref 3.5–5.2)
ALP SERPL-CCNC: 394 U/L (ref 40–150)
ALT SERPL W P-5'-P-CCNC: 896 U/L (ref 0–50)
ANA PAT SER IF-IMP: ABNORMAL
ANA SER QL IF: POSITIVE
ANA TITR SER IF: ABNORMAL {TITER}
AST SERPL W P-5'-P-CCNC: 731 U/L (ref 0–45)
BASOPHILS # BLD AUTO: 0.1 10E3/UL (ref 0–0.2)
BASOPHILS NFR BLD AUTO: 1 %
BILIRUB DIRECT SERPL-MCNC: 0.77 MG/DL (ref 0–0.3)
BILIRUB SERPL-MCNC: 1 MG/DL
EOSINOPHIL # BLD AUTO: 0 10E3/UL (ref 0–0.7)
EOSINOPHIL NFR BLD AUTO: 0 %
ERYTHROCYTE [DISTWIDTH] IN BLOOD BY AUTOMATED COUNT: 13.7 % (ref 10–15)
GLIADIN IGA SER-ACNC: 0.8 U/ML
GLIADIN IGG SER-ACNC: <0.6 U/ML
HCT VFR BLD AUTO: 35.7 % (ref 35–47)
HGB BLD-MCNC: 12.1 G/DL (ref 11.7–15.7)
IGA SERPL-MCNC: 143 MG/DL (ref 84–499)
IMM GRANULOCYTES # BLD: 0.1 10E3/UL
IMM GRANULOCYTES NFR BLD: 1 %
INR PPP: 1.02 (ref 0.85–1.15)
LAB DIRECTOR COMMENTS: NORMAL
LAB DIRECTOR DISCLAIMER: NORMAL
LAB DIRECTOR INTERPRETATION: NORMAL
LAB DIRECTOR METHODOLOGY: NORMAL
LAB DIRECTOR RESULTS: NORMAL
LOCATION OF TASK: NORMAL
LYMPHOCYTES # BLD AUTO: 6.5 10E3/UL (ref 0.8–5.3)
LYMPHOCYTES NFR BLD AUTO: 75 %
MAGNESIUM SERPL-MCNC: 2 MG/DL (ref 1.7–2.3)
MCH RBC QN AUTO: 31.7 PG (ref 26.5–33)
MCHC RBC AUTO-ENTMCNC: 33.9 G/DL (ref 31.5–36.5)
MCV RBC AUTO: 94 FL (ref 78–100)
MITOCHONDRIA M2 IGG SER-ACNC: <1 U/ML
MONOCYTES # BLD AUTO: 0.3 10E3/UL (ref 0–1.3)
MONOCYTES NFR BLD AUTO: 4 %
NEUTROPHILS # BLD AUTO: 1.7 10E3/UL (ref 1.6–8.3)
NEUTROPHILS NFR BLD AUTO: 19 %
NRBC # BLD AUTO: 0 10E3/UL
NRBC BLD AUTO-RTO: 0 /100
PHOSPHATE SERPL-MCNC: 2.6 MG/DL (ref 2.5–4.5)
PHOSPHATE SERPL-MCNC: 3.1 MG/DL (ref 2.5–4.5)
PLAT MORPH BLD: ABNORMAL
PLATELET # BLD AUTO: 127 10E3/UL (ref 150–450)
POTASSIUM SERPL-SCNC: 3.6 MMOL/L (ref 3.4–5.3)
PROT SERPL-MCNC: 6.3 G/DL (ref 6.4–8.3)
RBC # BLD AUTO: 3.82 10E6/UL (ref 3.8–5.2)
RBC MORPH BLD: ABNORMAL
SPECIMEN TYPE: NORMAL
TTG IGA SER-ACNC: 0.5 U/ML
TTG IGG SER-ACNC: 0.7 U/ML
VARIANT LYMPHS BLD QL SMEAR: PRESENT
WBC # BLD AUTO: 8.7 10E3/UL (ref 4–11)

## 2024-11-05 PROCEDURE — 85025 COMPLETE CBC W/AUTO DIFF WBC: CPT | Performed by: PHYSICIAN ASSISTANT

## 2024-11-05 PROCEDURE — 80076 HEPATIC FUNCTION PANEL: CPT | Performed by: PHYSICIAN ASSISTANT

## 2024-11-05 PROCEDURE — 85610 PROTHROMBIN TIME: CPT | Performed by: PHYSICIAN ASSISTANT

## 2024-11-05 PROCEDURE — G0452 MOLECULAR PATHOLOGY INTERPR: HCPCS | Mod: 26 | Performed by: PATHOLOGY

## 2024-11-05 PROCEDURE — 99233 SBSQ HOSP IP/OBS HIGH 50: CPT | Performed by: FAMILY MEDICINE

## 2024-11-05 PROCEDURE — 99232 SBSQ HOSP IP/OBS MODERATE 35: CPT | Performed by: INTERNAL MEDICINE

## 2024-11-05 PROCEDURE — 36415 COLL VENOUS BLD VENIPUNCTURE: CPT | Performed by: FAMILY MEDICINE

## 2024-11-05 PROCEDURE — 81256 HFE GENE: CPT | Performed by: PHYSICIAN ASSISTANT

## 2024-11-05 PROCEDURE — 83735 ASSAY OF MAGNESIUM: CPT | Performed by: FAMILY MEDICINE

## 2024-11-05 PROCEDURE — 120N000001 HC R&B MED SURG/OB

## 2024-11-05 PROCEDURE — 36415 COLL VENOUS BLD VENIPUNCTURE: CPT | Performed by: PHYSICIAN ASSISTANT

## 2024-11-05 PROCEDURE — 84100 ASSAY OF PHOSPHORUS: CPT | Performed by: FAMILY MEDICINE

## 2024-11-05 PROCEDURE — 250N000013 HC RX MED GY IP 250 OP 250 PS 637: Performed by: FAMILY MEDICINE

## 2024-11-05 PROCEDURE — 84132 ASSAY OF SERUM POTASSIUM: CPT | Performed by: FAMILY MEDICINE

## 2024-11-05 RX ORDER — TRAZODONE HYDROCHLORIDE 50 MG/1
50 TABLET, FILM COATED ORAL AT BEDTIME
Status: DISCONTINUED | OUTPATIENT
Start: 2024-11-05 | End: 2024-11-06 | Stop reason: HOSPADM

## 2024-11-05 RX ADMIN — Medication 1 CAPSULE: at 09:13

## 2024-11-05 RX ADMIN — IBUPROFEN 400 MG: 400 TABLET, FILM COATED ORAL at 21:43

## 2024-11-05 RX ADMIN — OXYCODONE 5 MG: 5 TABLET ORAL at 19:54

## 2024-11-05 RX ADMIN — CYCLOBENZAPRINE 10 MG: 10 TABLET, FILM COATED ORAL at 16:14

## 2024-11-05 RX ADMIN — OXYCODONE 5 MG: 5 TABLET ORAL at 04:11

## 2024-11-05 RX ADMIN — PANTOPRAZOLE SODIUM 40 MG: 40 TABLET, DELAYED RELEASE ORAL at 16:14

## 2024-11-05 RX ADMIN — TRAZODONE HYDROCHLORIDE 25 MG: 50 TABLET ORAL at 21:43

## 2024-11-05 RX ADMIN — PANTOPRAZOLE SODIUM 40 MG: 40 TABLET, DELAYED RELEASE ORAL at 06:48

## 2024-11-05 RX ADMIN — CYCLOBENZAPRINE 10 MG: 10 TABLET, FILM COATED ORAL at 06:48

## 2024-11-05 RX ADMIN — IBUPROFEN 400 MG: 400 TABLET, FILM COATED ORAL at 14:26

## 2024-11-05 RX ADMIN — IBUPROFEN 400 MG: 400 TABLET, FILM COATED ORAL at 01:00

## 2024-11-05 RX ADMIN — LIDOCAINE 1 PATCH: 4 PATCH TOPICAL at 14:26

## 2024-11-05 RX ADMIN — IBUPROFEN 400 MG: 400 TABLET, FILM COATED ORAL at 09:13

## 2024-11-05 RX ADMIN — SENNOSIDES AND DOCUSATE SODIUM 2 TABLET: 50; 8.6 TABLET ORAL at 09:13

## 2024-11-05 NOTE — PROGRESS NOTES
[facility]    I agree with the documentation and findings as written by Mariann Wetzel and our discussed and formulated assessment and plan. I was present with Mariann Wetzel  who participated in the service and documentation of the note. I have also personally verified the history and personally performed the physical exam and medical decision-making. I agree with the assessment and plan of care as documented in the note.    Tigist Pineda MD  Internal Medicine  Hospitalist  [facility]  Phone: #[phone]     Medicine Progress Note - Hospitalist Service    Date of Admission:  11/5/2024    Assessment & Plan   Taina Castro is a 58 year old female with a PMHx of depression and prior hysterectomy and appendectomy who was admitted on 11/2 due to elevated liver enzymes around the 700s. Patient was recently seen in the ER (10/28) due to left sided abdominal pain with chills and generalized malaise. Abdominal CT scan was unremarkable at that time. Patient was sent home with Tylenol 1000 mg every 6 hours as needed and ibuprofen 600 mg every 6 hours as needed. On day of admission patient presented with persistent abdominal pain, chills, body aches, and sweats. Elevated LFTs with -->451-->841-->731,  -->631-->926-->896 Poison control was notified, treated with N-acetylcysteine for possible Tylenol toxicity. Probable EBV infection.  EBV viral load 2130.  Patient works at .  No URI symptoms.  Evaluated by infectious disease and gastroenterology.  Recheck LFT in the morning.  If continues to improve will discharge in 1 day.    Patient endorsed drinking 1/2 glasses of red wine every day. No clear medication or toxin exposure.  No cholelithiasis on ultrasound.  Acute hepatitis, tickborne disease, Monospot are negative.  Ferritin is elevated likely from acute phase reaction.  Autoimmune hepatitis panels are pending.    Elevated LFTs  Probable EBV  infection, EBV DNA IU/mL, Instrument: 2,130  Generalized body aches   Lymphocytosis  - -->451-->841-->731,  -->631-->926 -->896  - Acute hepatitis panels are negative  - Tick-Borne Disease Panel: negative  - CMV: Not detected  -Flow Cytometry: There is no immunophenotypic evidence of non-Hodgkin lymphoma, lymphoid leukemia, clonal plasma cell process, or acute leukemia   - Ceruloplasmin: within normal limits  - EBV DNA IU/mL, Instrument: 2,130  - Pending: KRYSTLE, AMA, SMA (F actin), celiac, hemochromatosis mutation  - Elevated Ferritin likely acute phase reaction  Monitored in a.m., if improving will discharge   Appreciate ID and GI consult    ? 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 Vertebral body heights are maintained. There is trace retrolisthesis at L5-S1. Minimal levocurvature of the thoracolumbar spine with left apex at L1-L2.     Insomina  - Start Trazodone for sleep aid secondary to pain        Diet: Regular Diet Adult    DVT Prophylaxis: Pneumatic Compression Devices and Ambulate every shift  Kang Catheter: Not present  Lines: None     Cardiac Monitoring: None  Code Status: Full Code      Clinically Significant Risk Factors           # Hypocalcemia: Lowest Ca = 8.2 mg/dL in last 2 days, will monitor and replace as appropriate     # Hypoalbuminemia: Lowest albumin = 3.2 g/dL at 11/5/2024  5:12 AM, will monitor as appropriate   # Thrombocytopenia: Lowest platelets = 99 in last 2 days, will monitor for bleeding                       Medically Ready for Discharge: Anticipated Tomorrow.  Awaiting for LFTs to improve       The patient's care was discussed with the Attending Physician, Dr. Pineda .    YAIR BrewerS    Dr. Tigist Pineda   Hospitalist Service  [facility]  Securely message with Setup (more info)  Text page via [facility] Paging/Directory   ______________________________________________________________________    Interval History   Pertinent overnight events: Patient developed non radiating lower back pain to the right side with pain on thighs and legs. Patient was given extra oxycodone and spine XR was ordered    Today patient's vital sign are stable, she is A&Ox3, and appears in no acute distress. Patient was mildly shaky, slightly tachycardic on exam, and diaphoretic. Denies any chest pain, SOB, numbness, tingling, weakness, or headaches. States she has 3/10 lower back pain that has significantly improved after last night. She is still endorsing generalized pain in the thighs. Shares her sister has alopecia and a thyroid disorder. No other known autoimmune diseases.     Physical Exam   Vital Signs: Temp: 98.5  F (36.9  C) Temp src: Oral BP: 130/75 Pulse: 91   Resp: 16 SpO2: 92 % O2 Device: None (Room air)    Weight: 130 lbs 4.8 oz    Physical Exam  Vitals and nursing note reviewed.   Constitutional:       General: She is not in acute distress.     Appearance: She is not toxic-appearing or diaphoretic.   HENT:      Head: Normocephalic and atraumatic.   Eyes:      Extraocular Movements: Extraocular movements intact.      Pupils: Pupils are equal, round, and reactive to light.   Cardiovascular:      Rate and Rhythm: Normal rate and regular rhythm.      Pulses: Normal pulses.      Heart sounds: Normal heart sounds. No murmur heard.     No gallop.   Pulmonary:      Effort: Pulmonary effort is normal. No respiratory distress.      Breath sounds: Normal breath sounds. No stridor. No wheezing or rales.    Abdominal:      General: Abdomen is flat. Bowel sounds are normal. There is no distension.      Palpations: Abdomen is soft.      Tenderness: There is no abdominal tenderness.   Musculoskeletal:         General: No swelling or tenderness.      Cervical back: Normal range of motion.      Right lower leg: No edema.      Left lower leg: No edema.   Skin:     General: Skin is warm and dry.      Comments: Clammy   Neurological:      General: No focal deficit present.      Mental Status: She is alert and oriented to person, place, and time.      Sensory: No sensory deficit.      Motor: No weakness.   Psychiatric:         Mood and Affect: Mood normal.         Data     I have personally reviewed the following data over the past 24 hrs:    8.7  \   12.1   / 127 (L)     N/A N/A N/A /  N/A   3.6 N/A N/A \     ALT: 896 (HH) AST: 731 (HH) AP: 394 (H) TBILI: 1.0   ALB: 3.2 (L) TOT PROTEIN: 6.3 (L) LIPASE: N/A     INR:  1.02 PTT:  N/A   D-dimer:  N/A Fibrinogen:  N/A       Imaging results reviewed over the past 24 hrs:   No results found for this or any previous visit (from the past 24 hours).    Total time spent 50 min

## 2024-11-05 NOTE — PROGRESS NOTES
"INFECTIOUS DISEASE FOLLOW UP NOTE    Date: 11/05/2024   CHIEF COMPLAINT:   Chief Complaint   Patient presents with    Generalized Body Aches        ================================================================  SUBJECTIVE  Feels slightly improved this morning    ================================================================  OBJECTIVE  /75 (BP Location: Left arm)   Pulse 91   Temp 98.5  F (36.9  C) (Oral)   Resp 16   Ht 1.702 m (5' 7\")   Wt 59.1 kg (130 lb 4.8 oz)   LMP 07/20/2011   SpO2 92%   BMI 20.41 kg/m      Physical Exam  General: alert, cooperative, no apparent distress  HEENT: atraumatic, normocephalic, no scleral icterus, moist mucus membranes, no cervical lymphadenopathy  Heart: Normal rate, regular rhythm, no murmurs  Lungs: CTAB, good inspiratory effort  Abdomen: soft, non-tender, non-distended  Extremities: no peripheral edema, no new rashes or lesions      Pertinent labs  No results found for: \"CRP\"   CBC RESULTS:   Recent Labs   Lab Test 11/05/24  0512   WBC 8.7   RBC 3.82   HGB 12.1   HCT 35.7   MCV 94   MCH 31.7   MCHC 33.9   RDW 13.7   *      Imaging  10/28 CT A/P  1.  There is no evidence for hydronephrosis. Normal symmetric enhancement of the kidneys.  2.  Thickening of the right anterior aspect of the urinary bladder is similar to the prior exam. This may be postoperative in nature, however this should be correlated with patient's clinical history and consider urinalysis.  3.  No evidence for bowel obstruction or inflammation     11/2 US  1.  Trace fluid noted adjacent to the gallbladder, gallbladder otherwise unremarkable.  2.  Mild right pelvocaliectasis.  3.  No other acute findings in the abdomen     Microbiology data  10/28 RVP: negative  11/2 Hep A/B/C: negative  11/2 tick-borne panel: negative  11/2 blood culture: NGTD  11/2 monospot: negative  11/3 CMV PCR: undetectable  11/3 EBV PCR: 2,130        I have personally reviewed the relevant laboratory, imaging, and " microbiology data  ================================================================  Assessment  Taina Castro is a 58 year old with one week of systemic symptoms (fever, chills, abdominal pain) + transaminase elevation, leukopenia, thrombocytopenia, and inflammation.     Favored diagnosis is EBV at this time, though there is some ambiguity.  EBV, while typically manifesting with URI symptoms, can manifest with viremia and hepatitis and could explain her clinical picture.  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  Monospot was negative. Could investigate further with antibody testing, but given her clinical improvement does not seem necessary at this time.     Impression  # Probable EBV infection  # Transaminase elevation  # Leukopenia (resolved)  # Thrombocytopenia (improving)     ================================================================  Plan  - Trend blood cultures  - Follow GI workup / monitor transaminase levels  - No additional ID workup at this time  - ID will follow    Arik Downing MD, MPH  [facility]   Office Telephone [phone].  Fax [phone]  [facility] paging

## 2024-11-05 NOTE — PLAN OF CARE
Goal Outcome Evaluation:      Problem: Adult Inpatient Plan of Care  Goal: Absence of Hospital-Acquired Illness or Injury  Intervention: Prevent Skin Injury  Recent Flowsheet Documentation  Taken 11/5/2024 1300 by Marla Lara RN  Body Position: position changed independently       Patient alert and oriented x4 throughout the shift, pain managed with PRN ibuprofen and hot showers, PRN ibuprofen given at 2:26pm and PO 10mg Flexeril given at 1614, lidocain patch applied to left low back, these have been helpful for her, VSS, denies SOB, independent in her room and call light within reach/calls appropriately.

## 2024-11-05 NOTE — PLAN OF CARE
Problem: Adult Inpatient Plan of Care  Goal: Absence of Hospital-Acquired Illness or Injury  Intervention: Identify and Manage Fall Risk  Recent Flowsheet Documentation  Taken 11/5/2024 0100 by Sandra Strauss RN  Safety Promotion/Fall Prevention:   room organization consistent   safety round/check completed   clutter free environment maintained     Problem: Adult Inpatient Plan of Care  Goal: Absence of Hospital-Acquired Illness or Injury  Outcome: Progressing  Intervention: Identify and Manage Fall Risk  Recent Flowsheet Documentation  Taken 11/5/2024 0100 by Sandra Strauss RN  Safety Promotion/Fall Prevention:   room organization consistent   safety round/check completed   clutter free environment maintained  Intervention: Prevent Infection  Recent Flowsheet Documentation  Taken 11/5/2024 0100 by Sandra Strauss RN  Infection Prevention:   hand hygiene promoted   rest/sleep promoted   Goal Outcome Evaluation:      Plan of Care Reviewed With: patient    Overall Patient Progress: improvingOverall Patient Progress: improving pt alert and oriented, vss on room air can voice needs, independent, pain meds given, liver function deranged.

## 2024-11-05 NOTE — PROGRESS NOTES
Pt states she is having lower left back pain, PRN oxycodone given and also flexeril, with good effect. Vitally stable. Independent in the room.

## 2024-11-05 NOTE — PROGRESS NOTES
GI PROGRESS NOTE  11/5/2024  Taina Castro  1965  /-30    Subjective:  She is overall feeling better.     Objective:    Patient Vitals for the past 24 hrs:   BP Temp Temp src Pulse Resp SpO2 Weight   11/05/24 0834 130/75 98.5  F (36.9  C) Oral 91 16 92 % --   11/05/24 0548 -- -- -- -- -- -- 59.1 kg (130 lb 4.8 oz)   11/05/24 0043 136/79 99.2  F (37.3  C) Oral 101 16 96 % --   11/04/24 1541 119/71 98.5  F (36.9  C) Oral 108 16 94 % --   11/04/24 1338 106/69 98  F (36.7  C) Oral 116 16 96 % --     Body mass index is 20.41 kg/m .  Gen: NAD  GI: deferred    Laboratory  Recent Labs   Lab Test 11/05/24 0512 11/04/24 0704 11/03/24  0620   WBC 8.7 8.1 10.2   HGB 12.1 12.2 12.4   MCV 94 95 93   * 99* 84*   INR 1.02 1.01 1.13     Recent Labs   Lab Test 11/05/24 0512 11/04/24  0704 11/03/24  1456 11/03/24  0620 11/02/24  1021   NA  --  137  --  138 139   POTASSIUM 3.6 3.5 3.5 3.0* 3.6   CHLORIDE  --  101  --  101 100   CO2  --  27  --  25 26   BUN  --  4.2*  --  6.3 9.0   CR  --  0.56  --  0.60 0.68   ANIONGAP  --  9  --  12 13   TEENA  --  8.2*  --  8.1* 8.7*   GLC  --  101*  --  125* 102*     Recent Labs   Lab Test 11/05/24 0512 11/04/24  0704 11/03/24  0620 11/02/24  1021 10/28/24  2049   ALBUMIN 3.2* 3.2* 3.2*   < >  --    BILITOTAL 1.0 0.7 0.5   < >  --    * 926* 631*   < >  --    * 841* 451*   < >  --    ALKPHOS 394* 252* 230*   < >  --    PROTEIN  --   --   --   --  Negative    < > = values in this interval not displayed.     CRP 66.3 --> 35.4  CK low/normal  Alpha 1 antitrypsin 201 (not low)  Ceruloplasmin 41 (normal)  Iron 94 (normal),  (mildly low), iron sat 46% (high end of normal), ferritin 3504 (high)  Influenza negative  Mononucleosis negative  APAP < 5  Acute hepatitis panel negative, all non reactive  Tick borne PCR- negative anaplasma, babesia, ehrlichia  11/2 blood culture no growth to date  CMV- not detected  AMA- <1  EBV 2130 (high)     Pending: KRYSTLE, SMA (F  actin), celiac, hemochromatosis gene    US Abdomen Limited  Result Date: 11/2/2024  EXAM: US ABDOMEN LIMITED LOCATION: [facility] DATE: 11/2/2024 INDICATION: critical high AST, ALT COMPARISON: 10/28/2024 TECHNIQUE: Limited abdominal ultrasound. FINDINGS: GALLBLADDER: Trace fluid noted by the neck of the gallbladder. Gallbladder otherwise within normal limits. Sonographic Richardson sign is negative. Gallbladder wall within normal thickness. BILE DUCTS: No biliary dilatation. The common duct measures 5 mm. LIVER: Normal parenchyma with smooth contour. No focal mass. RIGHT KIDNEY: Mild right pelvocaliectasis. PANCREAS: The visualized portions are normal. No ascites.      IMPRESSION: 1.  Trace fluid noted adjacent to the gallbladder, gallbladder otherwise unremarkable. 2.  Mild right pelvocaliectasis. 3.  No other acute findings in the abdomen.      10/28/2024 CT abdomen/pelvis with contrast  IMPRESSION:   1.  There is no evidence for hydronephrosis. Normal symmetric enhancement of the kidneys.  2.  Thickening of the right anterior aspect of the urinary bladder is similar to the prior exam. This may be postoperative in nature, however this should be correlated with patient's clinical history and consider urinalysis.  3.  No evidence for bowel obstruction or inflammation.    Assessment:  The patient is a 58-year-old female who presents to the hospital with diffuse body aches, pain, malaise, diaphoresis, chills.  Initially she had normal LFTs and negative CT and was discharged with appropriate doses of Tylenol.  Came back with worsened symptoms and elevated LFTs.  No known sick contacts or obvious tick bite, no new meds or herbals.  No excessive alcohol intake reported, and ultrasound is negative. ID has been consulted.  Extensive liver serologies were ordered, and EBV was positive and is the most likely cause for her acute hepatitis.     Elevated liver enzymes.  Likely acute infectious illness due  to EBV.  Ultrasound is negative and other serologies essentially unrevealing thus far. Ferritin is elevated but likely acute phase reactant, and hemochromatosis gene test is pending.  She does report some abdominal bloating with eating gluten, and she could have celiac disease (lab pending).  She minimizes gluten intake but does eat sourdough bread.  Thrombocytopenia- improving    Patient Active Problem List   Diagnosis    Postoperative state    Viral syndrome    Lymphocytosis    Elevated LFTs    Tylenol overdose, accidental or unintentional, initial encounter        Plan:    1. Continue supportive cares.  2.  Recheck LFTs tomorrow and likely discharge if doing well.  3.  Follow up on pending liver serologies.  4.  Recommend to trend her LFTs with her primary as an outpatient, and she could follow up with our liver clinic if LFTs remain elevated.     20 minutes of total time was spent providing patient care, including patient evaluation, reviewing documentation/test results and .                                                Bethany Brice PA-C  Thank you for the opportunity to participate in the care of this patient.   Please feel free to call me with any questions or concerns.  Phone number [phone].

## 2024-11-06 VITALS
BODY MASS INDEX: 20.45 KG/M2 | HEIGHT: 67 IN | RESPIRATION RATE: 16 BRPM | OXYGEN SATURATION: 95 % | DIASTOLIC BLOOD PRESSURE: 76 MMHG | TEMPERATURE: 97.3 F | HEART RATE: 92 BPM | SYSTOLIC BLOOD PRESSURE: 117 MMHG | WEIGHT: 130.3 LBS

## 2024-11-06 LAB
ALBUMIN SERPL BCG-MCNC: 3.4 G/DL (ref 3.5–5.2)
ALP SERPL-CCNC: 402 U/L (ref 40–150)
ALT SERPL W P-5'-P-CCNC: 875 U/L (ref 0–50)
AST SERPL W P-5'-P-CCNC: 569 U/L (ref 0–45)
BILIRUB DIRECT SERPL-MCNC: 0.67 MG/DL (ref 0–0.3)
BILIRUB SERPL-MCNC: 0.9 MG/DL
MAGNESIUM SERPL-MCNC: 2.1 MG/DL (ref 1.7–2.3)
PHOSPHATE SERPL-MCNC: 3.9 MG/DL (ref 2.5–4.5)
POTASSIUM SERPL-SCNC: 3.5 MMOL/L (ref 3.4–5.3)
PROT SERPL-MCNC: 6.5 G/DL (ref 6.4–8.3)
SMA IGG SER-ACNC: 5 UNITS

## 2024-11-06 PROCEDURE — 99239 HOSP IP/OBS DSCHRG MGMT >30: CPT | Performed by: FAMILY MEDICINE

## 2024-11-06 PROCEDURE — 84132 ASSAY OF SERUM POTASSIUM: CPT | Performed by: FAMILY MEDICINE

## 2024-11-06 PROCEDURE — 80076 HEPATIC FUNCTION PANEL: CPT | Performed by: PHYSICIAN ASSISTANT

## 2024-11-06 PROCEDURE — 36415 COLL VENOUS BLD VENIPUNCTURE: CPT | Performed by: PHYSICIAN ASSISTANT

## 2024-11-06 PROCEDURE — 99232 SBSQ HOSP IP/OBS MODERATE 35: CPT | Performed by: INTERNAL MEDICINE

## 2024-11-06 PROCEDURE — 250N000013 HC RX MED GY IP 250 OP 250 PS 637: Performed by: FAMILY MEDICINE

## 2024-11-06 PROCEDURE — 83735 ASSAY OF MAGNESIUM: CPT | Performed by: FAMILY MEDICINE

## 2024-11-06 PROCEDURE — 84100 ASSAY OF PHOSPHORUS: CPT | Performed by: FAMILY MEDICINE

## 2024-11-06 RX ORDER — PANTOPRAZOLE SODIUM 40 MG/1
40 TABLET, DELAYED RELEASE ORAL
Qty: 30 TABLET | Refills: 0 | Status: SHIPPED | OUTPATIENT
Start: 2024-11-06

## 2024-11-06 RX ORDER — AMOXICILLIN 250 MG
1 CAPSULE ORAL 2 TIMES DAILY PRN
Qty: 12 TABLET | Refills: 0 | Status: SHIPPED | OUTPATIENT
Start: 2024-11-06

## 2024-11-06 RX ORDER — OXYCODONE HYDROCHLORIDE 5 MG/1
5 TABLET ORAL EVERY 6 HOURS PRN
Qty: 12 TABLET | Refills: 0 | Status: SHIPPED | OUTPATIENT
Start: 2024-11-06

## 2024-11-06 RX ORDER — POTASSIUM CHLORIDE 1500 MG/1
20 TABLET, EXTENDED RELEASE ORAL ONCE
Status: COMPLETED | OUTPATIENT
Start: 2024-11-06 | End: 2024-11-06

## 2024-11-06 RX ORDER — IBUPROFEN 200 MG
400 TABLET ORAL EVERY 6 HOURS PRN
COMMUNITY
Start: 2024-11-06

## 2024-11-06 RX ADMIN — PANTOPRAZOLE SODIUM 40 MG: 40 TABLET, DELAYED RELEASE ORAL at 06:37

## 2024-11-06 RX ADMIN — OXYCODONE 5 MG: 5 TABLET ORAL at 13:17

## 2024-11-06 RX ADMIN — POTASSIUM CHLORIDE 20 MEQ: 1500 TABLET, EXTENDED RELEASE ORAL at 06:36

## 2024-11-06 RX ADMIN — IBUPROFEN 400 MG: 400 TABLET, FILM COATED ORAL at 09:58

## 2024-11-06 RX ADMIN — OXYCODONE 5 MG: 5 TABLET ORAL at 02:04

## 2024-11-06 RX ADMIN — IBUPROFEN 400 MG: 400 TABLET, FILM COATED ORAL at 04:24

## 2024-11-06 RX ADMIN — CYCLOBENZAPRINE 10 MG: 10 TABLET, FILM COATED ORAL at 00:13

## 2024-11-06 RX ADMIN — Medication 1 CAPSULE: at 08:52

## 2024-11-06 RX ADMIN — LIDOCAINE 1 PATCH: 4 PATCH TOPICAL at 13:17

## 2024-11-06 ASSESSMENT — ACTIVITIES OF DAILY LIVING (ADL)
ADLS_ACUITY_SCORE: 0

## 2024-11-06 NOTE — DISCHARGE SUMMARY
[facility] MEDICINE  DISCHARGE SUMMARY     Primary Care Physician: Trista Leon  Admission Date: 11/2/2024   Discharge Provider: Tigist Pineda MD Discharge Date: 11/6/2024   Diet:   Regular diet   Code Status: Full Code   Activity: DCACTIVITY: Activity as tolerated        Condition at Discharge: Stable     REASON FOR PRESENTATION(See Admission Note for Details)   Diffuse abdominal pain, weakness    PRINCIPAL & ACTIVE DISCHARGE DIAGNOSES     Elevated LFTs  EBV infection, probable  Tylenol overdose  Generalized body ache, improved  Leukopenia, resolved  History of moderate alcohol use  Thrombocytopenia, improving  Back pain      PENDING LABS     Unresulted Labs Ordered in the Past 30 Days of this Admission       Date and Time Order Name Status Description    11/2/2024 10:08 AM Blood Culture Peripheral Blood Preliminary           PROCEDURES ( this hospitalization only)      None    RECOMMENDATIONS TO OUTPATIENT PROVIDER FOR F/U VISIT     Follow-up Appointments       Follow-up and recommended labs and tests       Follow-up with primary MD in 5 day  CMP and CBC in 5 day  Follow-up with gastroenterology/hepatology clinic in 2 weeks                DISPOSITION     Home    SUMMARY OF HOSPITAL COURSE:      Ms.Megan RAJINDER Castro is a 58 year old female with history of depression and prior hysterectomy and appendectomy came with generalized weakness, diffuse body ache, not feeling well. Seen in the ER on 10/28 with similar symptoms. Abdominal CT scan was unremarkable at that time. Patient was sent home with Tylenol 1000 mg every 6 hours as needed and ibuprofen 600 mg every 6 hours as needed.  Found to have elevated LFTs.  Initially thought Tylenol toxicity. Poison control was notified, treated with N-acetylcysteine.  Had extensive workup. Favored diagnosis is EBV at this time, though there is some ambiguity. EBV, while typically manifesting with URI symptoms, can manifest with viremia and  hepatitis and could explain her clinical picture. 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 Monospot was negative. Could investigate further with antibody testing, but given her clinical improvement does not seem necessary at this time.  Elevated LFTs with -->569,  -->875. Repeat Hepatic profile in 5 days. She could follow up with our liver clinic if LFTs remain elevated. Patient endorsed drinking 1-2 glasses of red wine every day.  Absolute cessation is advised. No clear medication or toxin exposure.  No cholelithiasis on ultrasound.  Acute hepatitis, tickborne disease,  Ferritin is elevated likely from acute phase reaction.  Autoimmune hepatitis panels are pending. Evaluated by infectious disease and gastroenterology.         Discharge Medications with Med changes:     Current Discharge Medication List        START taking these medications    Details   melatonin 3 MG tablet Take 1 tablet (3 mg) by mouth nightly as needed for sleep.    Associated Diagnoses: Acute bilateral low back pain without sciatica      oxyCODONE (ROXICODONE) 5 MG tablet Take 1 tablet (5 mg) by mouth every 6 hours as needed for severe pain.  Qty: 12 tablet, Refills: 0    Associated Diagnoses: Acute bilateral low back pain without sciatica      pantoprazole (PROTONIX) 40 MG EC tablet Take 1 tablet (40 mg) by mouth 2 times daily (before meals).  Qty: 30 tablet, Refills: 0    Associated Diagnoses: Abdominal pain, epigastric      senna-docusate (SENOKOT-S/PERICOLACE) 8.6-50 MG tablet Take 1 tablet by mouth 2 times daily as needed for constipation.  Qty: 12 tablet, Refills: 0    Associated Diagnoses: Acute bilateral low back pain without sciatica           CONTINUE these medications which have CHANGED    Details   ibuprofen (ADVIL/MOTRIN) 200 MG tablet Take 2 tablets (400 mg) by mouth every 6 hours as needed for pain.    Associated Diagnoses: Acute bilateral low back pain without sciatica            CONTINUE these medications which have NOT CHANGED    Details   cholecalciferol 25 MCG (1000 UT) TABS Take 1,000 Units by mouth daily      desvenlafaxine succinate (PRISTIQ) 25 MG 24 hr tablet Take 25 mg by mouth daily.      estradiol (ESTRACE) 0.1 MG/GM vaginal cream Place vaginally twice a week. Sundays and Thursdays      fish oil-omega-3 fatty acids 1000 MG capsule Take 1 g by mouth daily      fluticasone (FLONASE) 50 MCG/ACT nasal spray Spray 2 sprays into both nostrils daily as needed for rhinitis or allergies.      lactobacillus rhamnosus, GG, (CULTURELL) capsule Take 1 capsule by mouth daily      loratadine (CLARITIN) 10 MG tablet Take 10 mg by mouth daily as needed.      UNABLE TO FIND Take 1 Scoop by mouth daily. MEDICATION NAME: Sunfiber      vitamin B complex with vitamin C (VITAMIN  B COMPLEX) tablet Take 1 tablet by mouth daily           STOP taking these medications       acetaminophen (TYLENOL) 500 MG tablet Comments:   Reason for Stopping:                     Rationale for medication changes:      Tylenol discontinued        Consults       GASTROENTEROLOGY IP CONSULT  INFECTIOUS DISEASES IP CONSULT    Immunizations given this encounter     Most Recent Immunizations   Administered Date(s) Administered    COVID-19 MONOVALENT 12+ (Pf[employer]) 11/26/2021           Anticoagulation Information      None      SIGNIFICANT IMAGING FINDINGS     Results for orders placed or performed during the hospital encounter of 11/02/24   US Abdomen Limited    Impression    IMPRESSION:  1.  Trace fluid noted adjacent to the gallbladder, gallbladder otherwise unremarkable.  2.  Mild right pelvocaliectasis.  3.  No other acute findings in the abdomen.       XR Lumbar Spine 2/3 Views    Impression    IMPRESSION: Transitional spinal anatomy with partial lumbarization of the S1 vertebral body. Vertebral body heights are maintained. There is trace retrolisthesis at L5-S1. Minimal levocurvature of the thoracolumbar spine  with left apex at L1-L2.   Intervertebral disc heights are maintained. There is no bulky facet or endplate arthropathy. Extra spinal structures are unremarkable.       SIGNIFICANT LABORATORY FINDINGS   -->451-->841-->731-->569,  -->631-->926 -->896-->875     Microbiology data  10/28 RVP: negative  11/2 Hep A/B/C: negative  11/2 tick-borne panel: negative  11/2 blood culture: NGTD  11/2 monospot: negative  11/3 CMV PCR: undetectable  11/3 EBV PCR: 2,130    Discharge Orders        Reason for your hospital stay    Abdominal pain     Follow-up and recommended labs and tests     Follow-up with primary MD in 5 day  CMP and CBC in 5 day  Follow-up with gastroenterology/hepatology clinic in 2 weeks     Activity    Your activity upon discharge: activity as tolerated     Diet    Follow this diet upon discharge: regular diet       Examination   Physical Exam   Temp:  [97.3  F (36.3  C)-97.8  F (36.6  C)] 97.3  F (36.3  C)  Pulse:  [92-98] 92  Resp:  [16] 16  BP: (117-128)/(76-83) 117/76  SpO2:  [95 %-97 %] 95 %  Wt Readings from Last 1 Encounters:   11/05/24 59.1 kg (130 lb 4.8 oz)     GENERAL:  Alert, appears comfortable, in no acute distress, appears stated age   HEAD:  Normocephalic, without obvious abnormality, atraumatic   EYES:  PERRL, conjunctiva clear,  EOM's intact   NOSE: Nares normal,  mucosa normal, no drainage   THROAT: Lips, mucosa,  gums normal, mouth moist   NECK: Supple, symmetrical, trachea midline   BACK:   Symmetric, no curvature, ROM normal   LUNGS:   Clear to auscultation bilaterally, no rales, rhonchi, or wheezing, symmetric chest rise on inhalation, respirations unlabored   CHEST WALL:  No tenderness or deformity   HEART:  Regular rate and rhythm, S1 and S2 normal, no murmur, rub, or gallop    ABDOMEN:   Soft, non-tender, bowel sounds active  , no masses, no organomegaly, no rebound or guarding   EXTREMITIES: No BLE edema    SKIN: No rashes in the visualized areas   NEURO: Alert, oriented  x 3, moves all four extremities freely/spontaneously   PSYCH: Cooperative, behavior is appropriate          Please see EMR for more detailed significant labs, imaging, consultant notes etc.    I, Tigist Pineda MD, personally saw the patient today and spent greater than 30 minutes discharging this patient.    Tigist Pineda MD  [facility]    CC:Trista Leon

## 2024-11-06 NOTE — PLAN OF CARE
Pain reported at 3-7/10. Managed with oral prn pain meds. Denied chest pain, SOB, N/V. Voiding spontaneously. Independent in room. VSS on RA. Call light within reach - calling appropriately.       Problem: Adult Inpatient Plan of Care  Goal: Optimal Comfort and Wellbeing  Outcome: Progressing  Intervention: Monitor Pain and Promote Comfort  Recent Flowsheet Documentation  Taken 11/6/2024 0024 by Jade Hernandes, RN  Pain Management Interventions: medication (see MAR)  Taken 11/5/2024 1954 by Jade Hernandes, RN  Pain Management Interventions: medication (see MAR)   Goal Outcome Evaluation:

## 2024-11-06 NOTE — PROGRESS NOTES
"INFECTIOUS DISEASE FOLLOW UP NOTE    Date: 11/06/2024   CHIEF COMPLAINT:   Chief Complaint   Patient presents with    Generalized Body Aches        ================================================================  SUBJECTIVE  No events, feeling fine.    ================================================================  OBJECTIVE  /76 (BP Location: Left arm)   Pulse 92   Temp 97.3  F (36.3  C) (Oral)   Resp 16   Ht 1.702 m (5' 7\")   Wt 59.1 kg (130 lb 4.8 oz)   LMP 07/20/2011   SpO2 95%   BMI 20.41 kg/m      Physical Exam  General: alert, cooperative, no apparent distress  HEENT: atraumatic, normocephalic, no scleral icterus, moist mucus membranes, no cervical lymphadenopathy  Heart: Normal rate, regular rhythm, no murmurs  Lungs: CTAB, good inspiratory effort  Abdomen: soft, non-tender, non-distended  Extremities: no peripheral edema, no new rashes or lesions      Pertinent labs  No results found for: \"CRP\"   CBC RESULTS:   Recent Labs   Lab Test 11/05/24  0512   WBC 8.7   RBC 3.82   HGB 12.1   HCT 35.7   MCV 94   MCH 31.7   MCHC 33.9   RDW 13.7   *      Imaging  10/28 CT A/P  1.  There is no evidence for hydronephrosis. Normal symmetric enhancement of the kidneys.  2.  Thickening of the right anterior aspect of the urinary bladder is similar to the prior exam. This may be postoperative in nature, however this should be correlated with patient's clinical history and consider urinalysis.  3.  No evidence for bowel obstruction or inflammation     11/2 US  1.  Trace fluid noted adjacent to the gallbladder, gallbladder otherwise unremarkable.  2.  Mild right pelvocaliectasis.  3.  No other acute findings in the abdomen     Microbiology data  10/28 RVP: negative  11/2 Hep A/B/C: negative  11/2 tick-borne panel: negative  11/2 blood culture: NGTD  11/2 monospot: negative  11/3 CMV PCR: undetectable  11/3 EBV PCR: 2,130        I have personally reviewed the relevant laboratory, imaging, and microbiology " data  ================================================================  Assessment  Taina Castro is a 58 year old with one week of systemic symptoms (fever, chills, abdominal pain) + transaminase elevation, leukopenia, thrombocytopenia, and inflammation.     Favored diagnosis is EBV at this time, though there is some ambiguity.  EBV, while typically manifesting with URI symptoms, can manifest with viremia and hepatitis and could explain her clinical picture.  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  Monospot was negative. Could investigate further with antibody testing, but given her clinical improvement does not seem necessary at this time.     Impression  # Probable EBV infection  # Transaminase elevation  # Leukopenia (resolved)  # Thrombocytopenia (improving)     ================================================================  Plan  - No additional ID workup at this time  - ID will sign off    Arik Downing MD, MPH  [facility]   Office Telephone [phone].  Fax [phone]  [facility] paging

## 2024-11-06 NOTE — PLAN OF CARE
Pt alert and oriented. PRN ibuprofen and oxycodone used for pain management during the shift and effective. Lidocaine patch applied to back. Pt hoping to discharge this afternoon after been seen by GI.   Problem: Adult Inpatient Plan of Care  Goal: Readiness for Transition of Care  Outcome: Progressing     Problem: Pain Acute  Goal: Optimal Pain Control and Function  Outcome: Progressing   Goal Outcome Evaluation:

## 2024-11-07 ENCOUNTER — PATIENT OUTREACH (OUTPATIENT)
Dept: CARE COORDINATION | Facility: CLINIC | Age: 59
End: 2024-11-07
Payer: COMMERCIAL

## 2024-11-07 LAB — BACTERIA BLD CULT: NO GROWTH

## 2024-11-07 RX ORDER — CYCLOBENZAPRINE HCL 5 MG
5 TABLET ORAL 3 TIMES DAILY PRN
Qty: 15 TABLET | Refills: 0 | Status: SHIPPED | OUTPATIENT
Start: 2024-11-07

## 2024-11-07 NOTE — PROGRESS NOTES
"CHW routed the following message to pt's last attending provider, Tigist Pineda MD:     CHW contacted pt today for ED/Hospital discharge and follow-up. Pt mentioned she is doing well, but would've liked to have had a muscle relaxer before bed. Pt mentioned she was given a muscle relaxer, I.e., Flexeril, during her hospital stay but was not discharged with this medication. Pt's preferred pharmacy is:     [facility]  [address] [place], [place] [address] [phone]    Please contact pt at: [phone] (Home). Please advise.     Clinic Care Coordination Contact  Transitions of Care Outreach  Chief Complaint   Patient presents with    Clinic Care Coordination - Post Hospital       Most Recent Admission Date: 11/2/2024   Most Recent Admission Diagnosis: Viral syndrome - B34.9  Lymphocytosis - D72.820  Elevated LFTs - R79.89  Tylenol overdose, accidental or unintentional, initial encounter - T39.1X1A     Most Recent Discharge Date: 11/6/2024   Most Recent Discharge Diagnosis: Elevated LFTs - R79.89  Tylenol overdose, accidental or unintentional, initial encounter - T39.1X1A  Viral syndrome - B34.9  Lymphocytosis - D72.820  Acute bilateral low back pain without sciatica - M54.50  Abdominal pain, epigastric - R10.13     Transitions of Care Assessment    Discharge Assessment  How are you doing now that you are home?: \"Yeah I'm good. I wish they would've given me something to relax my muscles though...I didn't really have any issues at all when I got home but last night around bedtime I felt like I could've used that.\"  How are your symptoms? (Red Flag symptoms escalate to triage hotline per guidelines): Improved  Do you know how to contact your clinic care team if you have future questions or changes to your health status? : Yes  Does the patient have their discharge instructions? : Yes  Does the patient have questions regarding their discharge instructions? : No  Were you started on any new medications or were " there changes to any of your previous medications? : Yes  Does the patient have all of their medications?: Yes  Do you have questions regarding any of your medications? : No  Do you have all of your needed medical supplies or equipment (DME)?  (i.e. oxygen tank, CPAP, cane, etc.): Yes    Post-op ([facility] Only)  If the patient had a surgery or procedure, do they have any questions for a nurse?: No    Follow up Plan     Discharge Follow-Up  Discharge follow up appointment scheduled in alignment with recommended follow up timeframe or Transitions of Risk Category? (Low = within 30 days; Moderate= within 14 days; High= within 7 days): No  Patient's follow up appointment not scheduled: Patient declined scheduling support. Education on the importance of transitions of care follow up. Provided scheduling phone number.    No future appointments.    Outpatient Plan as outlined on AVS reviewed with patient.    For any urgent concerns, please contact our 24 hour nurse triage line: [phone] ([phone])       DEVIN Baker  [phone]  Connected Care Resource [facility]

## 2024-12-08 ENCOUNTER — HEALTH MAINTENANCE LETTER (OUTPATIENT)
Age: 59
End: 2024-12-08

## 2025-06-12 ENCOUNTER — HOSPITAL ENCOUNTER (OUTPATIENT)
Dept: CARDIOLOGY | Facility: CLINIC | Age: 60
End: 2025-06-12
Attending: FAMILY MEDICINE
Payer: COMMERCIAL

## 2025-06-12 DIAGNOSIS — H34.212 HOLLENHORST PLAQUE, LEFT EYE: ICD-10-CM

## 2025-06-12 LAB — LVEF ECHO: NORMAL

## 2025-06-12 PROCEDURE — 93306 TTE W/DOPPLER COMPLETE: CPT

## (undated) DEVICE — PROTECTOR ARM STANDARD ONE STEP

## (undated) DEVICE — SU VICRYL+ 0 27 UR6 VLT VCP603H

## (undated) DEVICE — PAD POS XL 1X20X40IN PINK PIGAZZI

## (undated) DEVICE — RETR ELEV / UTERINE MANIPULATOR V-CARE MED CUP 60-6085-201A

## (undated) DEVICE — TUBING IRRIG TUR Y TYPE 96" LF 6543-01

## (undated) DEVICE — SYR 10ML LL W/O NDL 302995

## (undated) DEVICE — SOL WATER IRRIG 1000ML BOTTLE 2F7114

## (undated) DEVICE — SUTURE PDS 2-0 27 VIO CT-2 + VLT PDP333

## (undated) DEVICE — PREP POVIDONE IODINE SOLUTION 10% 4OZ BOTTLE 29906-004

## (undated) DEVICE — CATH FOLEY 5CC 16FR SIL/LTX 0165V16S

## (undated) DEVICE — SU MONOCRYL+ 4-0 18IN PS2 UND MCP496G

## (undated) DEVICE — LUBRICANT INST ELECTROLUBE EL101

## (undated) DEVICE — SU PROLENE 0 MO-6 30" 8418H

## (undated) DEVICE — MAT FLOOR SURGICAL 40X38 0702140238

## (undated) DEVICE — PREP POVIDONE-IODINE 7.5% SCRUB 4OZ BOTTLE MDS093945

## (undated) DEVICE — GLOVE BIOGEL PI ULTRATOUCH G SZ 8.0 42180

## (undated) DEVICE — BLADE KNIFE SURG 11 371111

## (undated) DEVICE — BLADE KNIFE SURG 15 371115

## (undated) DEVICE — Device

## (undated) DEVICE — SYR 10ML FINGER CONTROL W/O NDL 309695

## (undated) DEVICE — SUTURE VICRYL+ 0 27IN CT-1 UND VCP260H

## (undated) DEVICE — BRIEF STRETCH XL MPS40

## (undated) DEVICE — TUBING LAP SUCT/IRRIG STRYKER 250070500

## (undated) DEVICE — TUBING INSUFFLATION W/FILTER CPC TO LUER 620-030-301

## (undated) DEVICE — NDL INSUFFLATION 13GA 120MM C2201

## (undated) DEVICE — SUTURE VICRYL+ 2-0 CT-2 27" UND VCP269H

## (undated) DEVICE — DRAPE SHEET TABLE COVER KC 42301*

## (undated) DEVICE — TUBING SMOKE EVAC PLUME PORT PP010CS

## (undated) DEVICE — GLOVE BIOGEL PI ULTRATOUCH G SZ 6.5 42165

## (undated) DEVICE — BLADE CLIPPER PIVOT PURPLE DISP 9660

## (undated) DEVICE — CUSTOM PACK DA VINCI GYN SMA5BDVHEA

## (undated) DEVICE — DECANTER VIAL 2006S

## (undated) DEVICE — SUCTION MANIFOLD NEPTUNE 2 SYS 1 PORT 702-025-000

## (undated) DEVICE — DAVINCI XI DRAPE ARM 470015

## (undated) DEVICE — GLOVE SURGEON PI ORTHO SZ 6-1/2 LF

## (undated) DEVICE — DAVINCI XI SEAL UNIVERSAL 5-8MM 470361

## (undated) DEVICE — SYSTEM CLEARIFY VISUALIZATION 21-345

## (undated) DEVICE — DRAPE U SPLIT 74X120" 29440

## (undated) DEVICE — SUTURE GORTEX 2N02A

## (undated) DEVICE — SOLUTION IV 2B0304X STRL WATER 1000ML

## (undated) DEVICE — ADH SKIN CLOSURE PREMIERPRO EXOFIN 1.0ML 3470

## (undated) DEVICE — LUBRICANT SURG 2 OZ STRL FLIP TOP 2OZLUB

## (undated) DEVICE — DAVINCI XI DRAPE COLUMN 470341

## (undated) DEVICE — DAVINCI HOT SHEARS TIP COVER  400180

## (undated) DEVICE — PREP CHLORAPREP 26ML TINTED HI-LITE ORANGE 930815

## (undated) DEVICE — ESU PENCIL SMOKE EVAC W/ROCKER SWITCH 0703-047-000

## (undated) DEVICE — SUTURE VICRYL+ 2-0 27IN SH UND VCP417H

## (undated) DEVICE — PLATE GROUNDING ADULT W/CORD 9165L

## (undated) DEVICE — ENDO TROCAR FIRST ENTRY KII FIOS Z-THRD 11X100MM CTF33

## (undated) DEVICE — SYR 50ML SLIP TIP W/O NDL 309654

## (undated) DEVICE — TUBING IRR TUR Y TYPE 2C4041

## (undated) DEVICE — GLOVE UNDER INDICATOR PI SZ 7.0 LF 41670

## (undated) DEVICE — ENDO POUCH UNIV RETRIEVAL SYSTEM INZII 10MM CD001

## (undated) DEVICE — NEEDLE HYPO 22X1-1/2 SAFETY 305900

## (undated) RX ORDER — LIDOCAINE HYDROCHLORIDE AND EPINEPHRINE 10; 10 MG/ML; UG/ML
INJECTION, SOLUTION INFILTRATION; PERINEURAL
Status: DISPENSED
Start: 2021-07-21

## (undated) RX ORDER — BUPIVACAINE HYDROCHLORIDE 2.5 MG/ML
INJECTION, SOLUTION EPIDURAL; INFILTRATION; INTRACAUDAL
Status: DISPENSED
Start: 2021-07-21